# Patient Record
Sex: MALE | Race: BLACK OR AFRICAN AMERICAN | Employment: PART TIME | URBAN - METROPOLITAN AREA
[De-identification: names, ages, dates, MRNs, and addresses within clinical notes are randomized per-mention and may not be internally consistent; named-entity substitution may affect disease eponyms.]

---

## 2017-04-28 ENCOUNTER — APPOINTMENT (OUTPATIENT)
Dept: GENERAL RADIOLOGY | Age: 47
End: 2017-04-28
Attending: EMERGENCY MEDICINE
Payer: MEDICAID

## 2017-04-28 ENCOUNTER — HOSPITAL ENCOUNTER (EMERGENCY)
Age: 47
Discharge: HOME | End: 2017-04-28
Attending: EMERGENCY MEDICINE
Payer: MEDICAID

## 2017-04-28 VITALS
DIASTOLIC BLOOD PRESSURE: 60 MMHG | OXYGEN SATURATION: 97 % | HEIGHT: 66 IN | HEART RATE: 61 BPM | TEMPERATURE: 98.5 F | WEIGHT: 198 LBS | SYSTOLIC BLOOD PRESSURE: 123 MMHG | BODY MASS INDEX: 31.82 KG/M2 | RESPIRATION RATE: 16 BRPM

## 2017-04-28 DIAGNOSIS — M54.42 ACUTE LEFT-SIDED LOW BACK PAIN WITH LEFT-SIDED SCIATICA: Primary | ICD-10-CM

## 2017-04-28 PROCEDURE — 99283 EMERGENCY DEPT VISIT LOW MDM: CPT

## 2017-04-28 PROCEDURE — 72100 X-RAY EXAM L-S SPINE 2/3 VWS: CPT

## 2017-04-28 PROCEDURE — 74011250637 HC RX REV CODE- 250/637: Performed by: EMERGENCY MEDICINE

## 2017-04-28 PROCEDURE — 96372 THER/PROPH/DIAG INJ SC/IM: CPT

## 2017-04-28 PROCEDURE — 74011250636 HC RX REV CODE- 250/636: Performed by: EMERGENCY MEDICINE

## 2017-04-28 PROCEDURE — 74011636637 HC RX REV CODE- 636/637: Performed by: EMERGENCY MEDICINE

## 2017-04-28 RX ORDER — PREDNISONE 20 MG/1
60 TABLET ORAL
Status: COMPLETED | OUTPATIENT
Start: 2017-04-28 | End: 2017-04-28

## 2017-04-28 RX ORDER — PREDNISONE 20 MG/1
60 TABLET ORAL DAILY
Qty: 12 TAB | Refills: 0 | Status: SHIPPED | OUTPATIENT
Start: 2017-04-28 | End: 2017-05-02

## 2017-04-28 RX ORDER — DIAZEPAM 5 MG/1
5 TABLET ORAL
Status: COMPLETED | OUTPATIENT
Start: 2017-04-28 | End: 2017-04-28

## 2017-04-28 RX ORDER — KETOROLAC TROMETHAMINE 30 MG/ML
60 INJECTION, SOLUTION INTRAMUSCULAR; INTRAVENOUS
Status: COMPLETED | OUTPATIENT
Start: 2017-04-28 | End: 2017-04-28

## 2017-04-28 RX ORDER — OXYCODONE AND ACETAMINOPHEN 5; 325 MG/1; MG/1
1-2 TABLET ORAL
Qty: 13 TAB | Refills: 0 | Status: SHIPPED | OUTPATIENT
Start: 2017-04-28 | End: 2017-06-04

## 2017-04-28 RX ORDER — HYDROMORPHONE HYDROCHLORIDE 1 MG/ML
1 INJECTION, SOLUTION INTRAMUSCULAR; INTRAVENOUS; SUBCUTANEOUS
Status: COMPLETED | OUTPATIENT
Start: 2017-04-28 | End: 2017-04-28

## 2017-04-28 RX ADMIN — KETOROLAC TROMETHAMINE 60 MG: 30 INJECTION, SOLUTION INTRAMUSCULAR at 12:31

## 2017-04-28 RX ADMIN — DIAZEPAM 5 MG: 5 TABLET ORAL at 12:29

## 2017-04-28 RX ADMIN — PREDNISONE 60 MG: 20 TABLET ORAL at 12:29

## 2017-04-28 RX ADMIN — HYDROMORPHONE HYDROCHLORIDE 1 MG: 1 INJECTION, SOLUTION INTRAMUSCULAR; INTRAVENOUS; SUBCUTANEOUS at 12:33

## 2017-04-28 NOTE — DISCHARGE INSTRUCTIONS
We hope that we have addressed all of your medical concerns. The examination and treatment you received in the Emergency Department were for an emergent problem and were not intended as complete care. It is important that you follow up with your healthcare provider(s) for ongoing care. If your symptoms worsen or do not improve as expected, and you are unable to reach your usual health care provider(s), you should return to the Emergency Department. Today's healthcare is undergoing tremendous change, and patient satisfaction surveys are one of the many tools to assess the quality of medical care. You may receive a survey from the [employer] regarding your experience in the Emergency Department. I hope that your experience has been completely positive, particularly the medical care that I provided. As such, please participate in the survey; anything less than excellent does not meet my expectations or intentions. [facility] and [facility] participate in nationally recognized quality of care measures. If your blood pressure is greater than 120/80, as reported below, we urge that you seek medical care to address the potential of high blood pressure, commonly known as hypertension. Hypertension can be hereditary or can be caused by certain medical conditions, pain, stress, or \"white coat syndrome. \"       Please make an appointment with your health care provider(s) for follow up of your Emergency Department visit. VITALS:   Patient Vitals for the past 8 hrs:   Temp Pulse Resp BP SpO2   04/28/17 1327 - 61 16 123/60 97 %   04/28/17 1101 98.5 °F (36.9 °C) 69 18 117/73 95 %          Thank you for allowing us to provide you with medical care today. We realize that you have many choices for your emergency care needs. Please choose us in the future for any continued health care needs. Jess Nguyen, [address] [facility] [place]: [phone]            No results found for this or any previous visit (from the past 24 hour(s)). Xr Spine Lumb 2 Or 3 V    Result Date: 4/28/2017  EXAM:  XR SPINE LUMB 2 OR 3 V INDICATION: Back pain with left leg radiculopathy. COMPARISON: None. FINDINGS: AP, lateral and spot lateral views of the lumbar spine demonstrate normal alignment. The vertebral body heights and disc spaces are well-preserved. There is no fracture, subluxation or other acute abnormality. IMPRESSION: Normal lumbar spine. Back Pain, Emergency or Urgent Symptoms: Care Instructions  Your Care Instructions  Many people have back pain at one time or another. In most cases, pain gets better with self-care that includes over-the-counter pain medicine, ice, heat, and exercises. Unless you have symptoms of a severe injury or heart attack, you may be able to give yourself a few days before you call a doctor. But some back problems are very serious. Do not ignore symptoms that need to be checked right away. Follow-up care is a key part of your treatment and safety. Be sure to make and go to all appointments, and call your doctor if you are having problems. It's also a good idea to know your test results and keep a list of the medicines you take. How can you care for yourself at home? · Sit or lie in positions that are most comfortable and that reduce your pain. Try one of these positions when you lie down:  ¨ Lie on your back with your knees bent and supported by large pillows. ¨ Lie on the floor with your legs on the seat of a sofa or chair. [place] on your side with your knees and hips bent and a pillow between your legs. ¨ Lie on your stomach if it does not make pain worse. · Do not sit up in bed, and avoid soft couches and twisted positions. Bed rest can help relieve pain at first, but it delays healing. Avoid bed rest after the first day. · Change positions every 30 minutes.  If you must sit for long periods of time, take breaks from sitting. Get up and walk around, or lie flat. · Try using a heating pad on a low or medium setting, for 15 to 20 minutes every 2 or 3 hours. Try a warm shower in place of one session with the heating pad. You can also buy single-use heat wraps that last up to 8 hours. You can also try ice or cold packs on your back for 10 to 20 minutes at a time, several times a day. (Put a thin cloth between the ice pack and your skin.) This reduces pain and makes it easier to be active and exercise. · Take pain medicines exactly as directed. ¨ If the doctor gave you a prescription medicine for pain, take it as prescribed. ¨ If you are not taking a prescription pain medicine, ask your doctor if you can take an over-the-counter medicine. When should you call for help? Call 911 anytime you think you may need emergency care. For example, call if:  · You are unable to move a leg at all. · You have back pain with severe belly pain. · You have symptoms of a heart attack. These may include:  ¨ Chest pain or pressure, or a strange feeling in the chest.  ¨ Sweating. ¨ Shortness of breath. ¨ Nausea or vomiting. ¨ Pain, pressure, or a strange feeling in the back, neck, jaw, or upper belly or in one or both shoulders or arms. ¨ Lightheadedness or sudden weakness. ¨ A fast or irregular heartbeat. After you call 911, the  may tell you to chew 1 adult-strength or 2 to 4 low-dose aspirin. Wait for an ambulance. Do not try to drive yourself. Call your doctor now or seek immediate medical care if:  · You have new or worse symptoms in your arms, legs, chest, belly, or buttocks. Symptoms may include:  ¨ Numbness or tingling. ¨ Weakness. ¨ Pain. · You lose bladder or bowel control. · You have back pain and:  ¨ You have injured your back while lifting or doing some other activity.  Call if the pain is severe, has not gone away after 1 or 2 days, and you cannot do your normal daily activities. ¨ You have had a back injury before that needed treatment. ¨ Your pain has lasted longer than 4 weeks. ¨ You have had weight loss you cannot explain. ¨ You are age 48 or older. ¨ You have cancer now or have had it before. Watch closely for changes in your health, and be sure to contact your doctor if you are not getting better as expected. Where can you learn more? Go to http://shawn-kristan.info/. Enter S380 in the search box to learn more about \"Back Pain, Emergency or Urgent Symptoms: Care Instructions. \"  Current as of: May 27, 2016  Content Version: 11.2  © 6999-0684 [employer]. Care instructions adapted under license by [employer] (which disclaims liability or warranty for this information). If you have questions about a medical condition or this instruction, always ask your healthcare professional. [address] any warranty or liability for your use of this information. Sciatica: Exercises  Your Care Instructions  Here are some examples of typical rehabilitation exercises for your condition. Start each exercise slowly. Ease off the exercise if you start to have pain. Your doctor or physical therapist will tell you when you can start these exercises and which ones will work best for you. When you are not being active, find a comfortable position for rest. Some people are comfortable on the floor or a medium-firm bed with a small pillow under their head and another under their knees. Some people prefer to lie on their side with a pillow between their knees. Don't stay in one position for too long. Take short walks (10 to 20 minutes) every 2 to 3 hours. Avoid slopes, hills, and stairs until you feel better. Walk only distances you can manage without pain, especially leg pain. How to do the exercises  Back stretches    1. Get down on your hands and knees on the floor. 2. Relax your head and allow it to droop. Round your back up toward the ceiling until you feel a nice stretch in your upper, middle, and lower back. Hold this stretch for as long as it feels comfortable, or about 15 to 30 seconds. 3. Return to the starting position with a flat back while you are on your hands and knees. 4. Let your back sway by pressing your stomach toward the floor. Lift your buttocks toward the ceiling. 5. Hold this position for 15 to 30 seconds. 6. Repeat 2 to 4 times. Follow-up care is a key part of your treatment and safety. Be sure to make and go to all appointments, and call your doctor if you are having problems. It's also a good idea to know your test results and keep a list of the medicines you take. Where can you learn more? Go to http://shawn-kristan.info/. Enter I538 in the search box to learn more about \"Sciatica: Exercises. \"  Current as of: May 23, 2016  Content Version: 11.2  © 3951-6473 [employer]. Care instructions adapted under license by [employer] (which disclaims liability or warranty for this information). If you have questions about a medical condition or this instruction, always ask your healthcare professional. [place] [address] any warranty or liability for your use of this information. Sciatica: Care Instructions  Your Care Instructions    Sciatica (say \"qpt-EW-yu-kuh\") is an irritation of one of the sciatic nerves, which come from the spinal cord in the lower back. The sciatic nerves and their branches extend down through the buttock to the foot. Sciatica can develop when an injured disc in the back presses against a spinal nerve root. Its main symptom is pain, numbness, or weakness that is often worse in the leg or foot than in the back. Sciatica often will improve and go away with time. Early treatment usually includes medicines and exercises to relieve pain. Follow-up care is a key part of your treatment and safety.  Be sure to make and go to all appointments, and call your doctor if you are having problems. It's also a good idea to know your test results and keep a list of the medicines you take. How can you care for yourself at home? · Take pain medicines exactly as directed. ¨ If the doctor gave you a prescription medicine for pain, take it as prescribed. ¨ If you are not taking a prescription pain medicine, ask your doctor if you can take an over-the-counter medicine. · Use heat or ice to relieve pain. ¨ To apply heat, put a warm water bottle, heating pad set on low, or warm cloth on your back. Do not go to sleep with a heating pad on your skin. ¨ To use ice, put ice or a cold pack on the area for 10 to 20 minutes at a time. Put a thin cloth between the ice and your skin. · Avoid sitting if possible, unless it feels better than standing. · Alternate lying down with short walks. Increase your walking distance as you are able to without making your symptoms worse. · Do not do anything that makes your symptoms worse. When should you call for help? Call 911 anytime you think you may need emergency care. For example, call if:  · You are unable to move a leg at all. Call your doctor now or seek immediate medical care if:  · You have new or worse symptoms in your legs or buttocks. Symptoms may include:  ¨ Numbness or tingling. ¨ Weakness. ¨ Pain. · You lose bladder or bowel control. Watch closely for changes in your health, and be sure to contact your doctor if:  · You are not getting better as expected. Where can you learn more? Go to http://shawn-kristan.info/. Enter [phone] in the search box to learn more about \"Sciatica: Care Instructions. \"  Current as of: May 23, 2016  Content Version: 11.2  © 2879-7586 [employer]. Care instructions adapted under license by [employer] (which disclaims liability or warranty for this information).  If you have questions about a medical condition or this instruction, always ask your healthcare professional. [place] [address] any warranty or liability for your use of this information.

## 2017-04-28 NOTE — LETTER
[facility] EMERGENCY DEPT 
[address] [facility] [address] [id number] 
[phone] Work/School Note Date: 4/28/2017 To Whom It May concern: 
 
Nena Calvert was seen and treated today in the emergency room by the following provider(s): 
Attending Provider: Iron Harris MD 
Physician Assistant: Renetta Cuevas PA-C. Nena Calvert may return to work on 5/1/17.  
 
Sincerely, 
 
 
 
 
Renetta Cuevas PA-C

## 2017-04-28 NOTE — ED NOTES
The patient was discharged home by JOHYN Livingston in stable condition. The patient is alert and oriented, in no respiratory distress. The patient's diagnosis, condition and treatment were explained. The patient expressed understanding. No prescriptions given. Work note given. A discharge plan has been developed. A  was not involved in the process. Aftercare instructions were given. Pt ambulatory out of the ED.

## 2017-04-28 NOTE — ED NOTES
Patient resting on stretcher in no distress. Patient medicated for pain as ordered. Patient tolerated PO and IM medication well. Patient verbalizes no requests or concerns at this time. Call bell in reach. Will continue to monitor.

## 2017-04-28 NOTE — ED PROVIDER NOTES
HPI Comments: 56-year-old male presents to emergency department for evaluation of lower back pain. Pain began approximately 3 days ago. The day before the pain began, patient did do some lifting. He denies injury or trauma to the back. The pain has slowly gotten worse. It is located in the left lower back radiates into the left buttock. He has no numbness or tingling of the lower extremities. He has no saddle anesthesia. He has no loss of bowel or bladder control or difficulty urinating. No urinary retention. No fever or chills. No dysuria, frequency or urgency. No nausea or vomiting. No abdominal pain. He denies any history of chronic back pain. No injection drug use. No history of abscesses. He tried ibuprofen and Tylenol with no relief. Standing upright makes the pain worse. Patient reports pain is slightly improved with lying on his right side. Pain is currently rated 10 out of 10. Pain is described as sharp and shooting. Social hx  +smoker  No alcohol    Patient is a 42 y.o. male presenting with back pain. The history is provided by the patient. Back Pain    Pertinent negatives include no chest pain, no fever, no numbness, no headaches, no abdominal pain, no dysuria and no weakness. History reviewed. No pertinent past medical history. History reviewed. No pertinent surgical history. History reviewed. No pertinent family history. Social History     Social History    Marital status:      Spouse name: N/A    Number of children: N/A    Years of education: N/A     Occupational History    Not on file. Social History Main Topics    Smoking status: Current Every Day Smoker    Smokeless tobacco: Not on file    Alcohol use No    Drug use: Not on file    Sexual activity: Not on file     Other Topics Concern    Not on file     Social History Narrative         ALLERGIES: Review of patient's allergies indicates no known allergies.     Review of Systems   Constitutional: Negative for chills and fever. Respiratory: Negative for chest tightness and shortness of breath. Cardiovascular: Negative for chest pain. Gastrointestinal: Negative for abdominal pain, nausea and vomiting. Genitourinary: Negative for decreased urine volume, difficulty urinating, dysuria, flank pain, frequency and urgency. Musculoskeletal: Positive for back pain. Negative for arthralgias, myalgias, neck pain and neck stiffness. Skin: Negative for rash and wound. Neurological: Negative for weakness, numbness and headaches. All other systems reviewed and are negative. Vitals:    04/28/17 1101   BP: 117/73   Pulse: 69   Resp: 18   Temp: 98.5 °F (36.9 °C)   SpO2: 95%   Weight: 89.8 kg (198 lb)   Height: 5' 6\" (1.676 m)            Physical Exam   Constitutional: He is oriented to person, place, and time. He appears well-developed and well-nourished. No distress. HENT:   Head: Normocephalic and atraumatic. Eyes: Conjunctivae and EOM are normal. Pupils are equal, round, and reactive to light. Neck: Normal range of motion. Neck supple. No midline tenderness to palpation of cspine. Cardiovascular: Normal rate and regular rhythm. Pulmonary/Chest: Effort normal and breath sounds normal. No respiratory distress. He has no wheezes. He exhibits no tenderness. Abdominal: Soft. Bowel sounds are normal. He exhibits no distension and no mass. There is no tenderness. There is no rebound and no guarding. No aortic bruits,  No femoral bruits. 2+ femoral pulses     Musculoskeletal: Normal range of motion. He exhibits no edema or tenderness. No TLS spine pain with palpation. Left lumbar tender to palpation at SI joint. No stepoffs, no deformity  No redness, rashes, warmth, or cellulitis. 5/5 flexion/extension of hips bilaterally  5/5 great toes strength bilaterally  5/5 dorsiflexion/plantarflexion bilaterally  Straight leg raise negative. No saddle anesthesia present.      Neurological: He is oriented to person, place, and time. He has normal reflexes. No cranial nerve deficit. He exhibits normal muscle tone. Coordination normal.   Skin: Skin is warm and dry. No rash noted. No erythema. Psychiatric: He has a normal mood and affect. His behavior is normal. Judgment and thought content normal.   Nursing note and vitals reviewed. MDM  Number of Diagnoses or Management Options  Acute left-sided low back pain with left-sided sciatica:   Diagnosis management comments: 19-year-old male presenting for lower back pain. He is neurovascularly intact. He is afebrile. He is nontoxic-appearing. Plan: X-ray of the pain medication    1:55 PM  The patient is in overall good health. The patient denies a history of IV drug abuse, skin infections, or chronic back problems. Pt is ambulatory full weight bearing. Pt is eating and drinking coffee in ER. The patient has low back pain without signs of spinal cord compression, cauda equina syndrome, infection, abscess, aneurysm, or other medically serious etiology. The patient is neurologically intact. Given the low risk of these diagnoses further testing and evaluation for these possibilities does not appear to be indicated at this time. The patient has been instructed to return if the symptoms worsen or change in any way. Standard narcotic and sedating medication warnings given  Patient's results have been reviewed with them. Patient and/or family have verbally conveyed their understanding and agreement of the patient's signs, symptoms, diagnosis, treatment and prognosis and additionally agree to follow up as recommended or return to the Emergency Room should their condition change prior to follow-up. Discharge instructions have also been provided to the patient with some educational information regarding their diagnosis as well a list of reasons why they would want to return to the ER prior to their follow-up appointment should their condition change.             ED Course Procedures         Pt case including HPI, PE, and all available lab and radiology results has been discussed with attending physician. Opportunity to evaluate patient has been provided to ER attending. Discharge and prescription plan has been agreed upon.

## 2017-04-28 NOTE — ED NOTES
New orders received. Patient updated regarding plan of care; verbalizes understanding and agreement to current care plan. Call bell in reach. Family at bedside.

## 2017-06-04 ENCOUNTER — HOSPITAL ENCOUNTER (EMERGENCY)
Age: 47
Discharge: HOME | End: 2017-06-04
Attending: STUDENT IN AN ORGANIZED HEALTH CARE EDUCATION/TRAINING PROGRAM
Payer: MEDICAID

## 2017-06-04 ENCOUNTER — APPOINTMENT (OUTPATIENT)
Dept: GENERAL RADIOLOGY | Age: 47
End: 2017-06-04
Attending: NURSE PRACTITIONER
Payer: MEDICAID

## 2017-06-04 VITALS
DIASTOLIC BLOOD PRESSURE: 76 MMHG | HEART RATE: 57 BPM | SYSTOLIC BLOOD PRESSURE: 119 MMHG | RESPIRATION RATE: 17 BRPM | TEMPERATURE: 98.6 F | HEIGHT: 67 IN | BODY MASS INDEX: 28.25 KG/M2 | OXYGEN SATURATION: 96 % | WEIGHT: 180 LBS

## 2017-06-04 DIAGNOSIS — R07.9 CHEST PAIN, UNSPECIFIED TYPE: Primary | ICD-10-CM

## 2017-06-04 LAB
ALBUMIN SERPL BCP-MCNC: 3.2 G/DL (ref 3.5–5)
ALBUMIN/GLOB SERPL: 1 {RATIO} (ref 1.1–2.2)
ALP SERPL-CCNC: 85 U/L (ref 45–117)
ALT SERPL-CCNC: 23 U/L (ref 12–78)
ANION GAP BLD CALC-SCNC: 10 MMOL/L (ref 5–15)
AST SERPL W P-5'-P-CCNC: 17 U/L (ref 15–37)
BASOPHILS # BLD AUTO: 0.1 K/UL (ref 0–0.1)
BASOPHILS # BLD: 0 % (ref 0–1)
BILIRUB SERPL-MCNC: 0.6 MG/DL (ref 0.2–1)
BUN SERPL-MCNC: 14 MG/DL (ref 6–20)
BUN/CREAT SERPL: 16 (ref 12–20)
CALCIUM SERPL-MCNC: 8.6 MG/DL (ref 8.5–10.1)
CHLORIDE SERPL-SCNC: 108 MMOL/L (ref 97–108)
CO2 SERPL-SCNC: 22 MMOL/L (ref 21–32)
CREAT SERPL-MCNC: 0.87 MG/DL (ref 0.7–1.3)
D DIMER PPP FEU-MCNC: 0.23 MG/L FEU (ref 0–0.65)
EOSINOPHIL # BLD: 0.2 K/UL (ref 0–0.4)
EOSINOPHIL NFR BLD: 2 % (ref 0–7)
ERYTHROCYTE [DISTWIDTH] IN BLOOD BY AUTOMATED COUNT: 12.8 % (ref 11.5–14.5)
GLOBULIN SER CALC-MCNC: 3.3 G/DL (ref 2–4)
GLUCOSE SERPL-MCNC: 86 MG/DL (ref 65–100)
HCT VFR BLD AUTO: 39.5 % (ref 36.6–50.3)
HGB BLD-MCNC: 13.9 G/DL (ref 12.1–17)
LYMPHOCYTES # BLD AUTO: 24 % (ref 12–49)
LYMPHOCYTES # BLD: 2.6 K/UL (ref 0.8–3.5)
MCH RBC QN AUTO: 33.2 PG (ref 26–34)
MCHC RBC AUTO-ENTMCNC: 35.2 G/DL (ref 30–36.5)
MCV RBC AUTO: 94.3 FL (ref 80–99)
MONOCYTES # BLD: 0.5 K/UL (ref 0–1)
MONOCYTES NFR BLD AUTO: 5 % (ref 5–13)
NEUTS SEG # BLD: 7.8 K/UL (ref 1.8–8)
NEUTS SEG NFR BLD AUTO: 69 % (ref 32–75)
PLATELET # BLD AUTO: 343 K/UL (ref 150–400)
POTASSIUM SERPL-SCNC: 4 MMOL/L (ref 3.5–5.1)
PROT SERPL-MCNC: 6.5 G/DL (ref 6.4–8.2)
RBC # BLD AUTO: 4.19 M/UL (ref 4.1–5.7)
SODIUM SERPL-SCNC: 140 MMOL/L (ref 136–145)
TROPONIN I SERPL-MCNC: <0.04 NG/ML
WBC # BLD AUTO: 11.2 K/UL (ref 4.1–11.1)

## 2017-06-04 PROCEDURE — 93005 ELECTROCARDIOGRAM TRACING: CPT

## 2017-06-04 PROCEDURE — 84484 ASSAY OF TROPONIN QUANT: CPT | Performed by: NURSE PRACTITIONER

## 2017-06-04 PROCEDURE — 80053 COMPREHEN METABOLIC PANEL: CPT | Performed by: NURSE PRACTITIONER

## 2017-06-04 PROCEDURE — 71020 XR CHEST PA LAT: CPT

## 2017-06-04 PROCEDURE — 36415 COLL VENOUS BLD VENIPUNCTURE: CPT | Performed by: NURSE PRACTITIONER

## 2017-06-04 PROCEDURE — 85025 COMPLETE CBC W/AUTO DIFF WBC: CPT | Performed by: NURSE PRACTITIONER

## 2017-06-04 PROCEDURE — 85379 FIBRIN DEGRADATION QUANT: CPT | Performed by: NURSE PRACTITIONER

## 2017-06-04 PROCEDURE — 99285 EMERGENCY DEPT VISIT HI MDM: CPT

## 2017-06-04 RX ORDER — IBUPROFEN 800 MG/1
800 TABLET ORAL
Qty: 15 TAB | Refills: 0 | Status: SHIPPED | OUTPATIENT
Start: 2017-06-04

## 2017-06-04 NOTE — DISCHARGE INSTRUCTIONS
Chest Pain: Care Instructions  Your Care Instructions  There are many things that can cause chest pain. Some are not serious and will get better on their own in a few days. But some kinds of chest pain need more testing and treatment. Your doctor may have recommended a follow-up visit in the next 8 to 12 hours. If you are not getting better, you may need more tests or treatment. Even though your doctor has released you, you still need to watch for any problems. The doctor carefully checked you, but sometimes problems can develop later. If you have new symptoms or if your symptoms do not get better, get medical care right away. If you have worse or different chest pain or pressure that lasts more than 5 minutes or you passed out (lost consciousness), call 911 or seek other emergency help right away. A medical visit is only one step in your treatment. Even if you feel better, you still need to do what your doctor recommends, such as going to all suggested follow-up appointments and taking medicines exactly as directed. This will help you recover and help prevent future problems. How can you care for yourself at home? · Rest until you feel better. · Take your medicine exactly as prescribed. Call your doctor if you think you are having a problem with your medicine. · Do not drive after taking a prescription pain medicine. When should you call for help? Call 911 if:  · You passed out (lost consciousness). · You have severe difficulty breathing. · You have symptoms of a heart attack. These may include:  ¨ Chest pain or pressure, or a strange feeling in your chest.  ¨ Sweating. ¨ Shortness of breath. ¨ Nausea or vomiting. ¨ Pain, pressure, or a strange feeling in your back, neck, jaw, or upper belly or in one or both shoulders or arms. ¨ Lightheadedness or sudden weakness. ¨ A fast or irregular heartbeat.   After you call 911, the  may tell you to chew 1 adult-strength or 2 to 4 low-dose aspirin. Wait for an ambulance. Do not try to drive yourself. Call your doctor today if:  · You have any trouble breathing. · Your chest pain gets worse. · You are dizzy or lightheaded, or you feel like you may faint. · You are not getting better as expected. · You are having new or different chest pain. Where can you learn more? Go to http://shawn-kristan.info/. Enter A120 in the search box to learn more about \"Chest Pain: Care Instructions. \"  Current as of: May 27, 2016  Content Version: 11.2  © 9879-3831 [employer]. Care instructions adapted under license by [employer] (which disclaims liability or warranty for this information). If you have questions about a medical condition or this instruction, always ask your healthcare professional. [address] any warranty or liability for your use of this information. Musculoskeletal Chest Pain: Care Instructions  Your Care Instructions  Chest pain is not always a sign that something is wrong with your heart or that you have another serious problem. The doctor thinks your chest pain is caused by strained muscles or ligaments, inflamed chest cartilage, or another problem in your chest, rather than by your heart. You may need more tests to find the cause of your chest pain. Follow-up care is a key part of your treatment and safety. Be sure to make and go to all appointments, and call your doctor if you are having problems. Its also a good idea to know your test results and keep a list of the medicines you take. How can you care for yourself at home? · Take pain medicines exactly as directed. ¨ If the doctor gave you a prescription medicine for pain, take it as prescribed. ¨ If you are not taking a prescription pain medicine, ask your doctor if you can take an over-the-counter medicine. · Rest and protect the sore area.   · Stop, change, or take a break from any activity that may be causing your pain or soreness. · Put ice or a cold pack on the sore area for 10 to 20 minutes at a time. Try to do this every 1 to 2 hours for the next 3 days (when you are awake) or until the swelling goes down. Put a thin cloth between the ice and your skin. · After 2 or 3 days, apply a heating pad set on low or a warm cloth to the area that hurts. Some doctors suggest that you go back and forth between hot and cold. · Do not wrap or tape your ribs for support. This may cause you to take smaller breaths, which could increase your risk of lung problems. · Mentholated creams such as Bengay or Icy Hot may soothe sore muscles. Follow the instructions on the package. · Follow your doctor's instructions for exercising. · Gentle stretching and massage may help you get better faster. Stretch slowly to the point just before pain begins, and hold the stretch for at least 15 to 30 seconds. Do this 3 or 4 times a day. Stretch just after you have applied heat. · As your pain gets better, slowly return to your normal activities. Any increased pain may be a sign that you need to rest a while longer. When should you call for help? Call 911 anytime you think you may need emergency care. For example, call if:  · You have chest pain or pressure. This may occur with:  ¨ Sweating. ¨ Shortness of breath. ¨ Nausea or vomiting. ¨ Pain that spreads from the chest to the neck, jaw, or one or both shoulders or arms. ¨ Dizziness or lightheadedness. ¨ A fast or uneven pulse. After calling 911, chew 1 adult-strength aspirin. Wait for an ambulance. Do not try to drive yourself. · You have sudden chest pain and shortness of breath, or you cough up blood. Call your doctor now or seek immediate medical care if:  · You have any trouble breathing. · Your chest pain gets worse.   · Your chest pain occurs consistently with exercise and is relieved by rest.  Watch closely for changes in your health, and be sure to contact your doctor if:  · Your chest pain does not get better after 1 week. Where can you learn more? Go to http://shawn-kristan.info/. Enter V293 in the search box to learn more about \"Musculoskeletal Chest Pain: Care Instructions. \"  Current as of: May 27, 2016  Content Version: 11.2  © 0631-9243 [employer]. Care instructions adapted under license by [employer] (which disclaims liability or warranty for this information). If you have questions about a medical condition or this instruction, always ask your healthcare professional. [place] [address] any warranty or liability for your use of this information. We hope that we have addressed all of your medical concerns. The examination and treatment you received in the Emergency Department were for an emergent problem and were not intended as complete care. It is important that you follow up with your healthcare provider(s) for ongoing care. If your symptoms worsen or do not improve as expected, and you are unable to reach your usual health care provider(s), you should return to the Emergency Department. Today's healthcare is undergoing tremendous change, and patient satisfaction surveys are one of the many tools to assess the quality of medical care. You may receive a survey from the [employer] organization regarding your experience in the Emergency Department. I hope that your experience has been completely positive, particularly the medical care that I provided. As such, please participate in the survey; anything less than excellent does not meet my expectations or intentions. [facility] and [address] [place] [address] participate in nationally recognized quality of care measures. If your blood pressure is greater than 120/80, as reported below, we urge that you seek medical care to address the potential of high blood pressure, commonly known as hypertension.    Hypertension can be hereditary or can be caused by certain medical conditions, pain, stress, or \"white coat syndrome. \"       Please make an appointment with your health care provider(s) for follow up of your Emergency Department visit. VITALS:   Patient Vitals for the past 8 hrs:   Temp Pulse Resp BP SpO2   06/04/17 1745 - (!) 56 17 106/67 96 %   06/04/17 1730 - (!) 49 12 125/72 97 %   06/04/17 1715 - (!) 51 17 121/69 95 %   06/04/17 1700 - (!) 55 15 114/66 95 %   06/04/17 1645 - (!) 46 16 111/65 96 %   06/04/17 1515 - (!) 56 18 152/85 95 %   06/04/17 1500 - 65 12 (!) 158/135 95 %   06/04/17 1445 98.6 °F (37 °C) 65 13 153/87 95 %          Thank you for allowing us to provide you with medical care today. We realize that you have many choices for your emergency care needs. Please choose us in the future for any continued health care needs. Joaquin Palmer, [address].   Office: [phone]            Recent Results (from the past 24 hour(s))   CBC WITH AUTOMATED DIFF    Collection Time: 06/04/17  2:46 PM   Result Value Ref Range    WBC 11.2 (H) 4.1 - 11.1 K/uL    RBC 4.19 4. 10 - 5.70 M/uL    HGB 13.9 12.1 - 17.0 g/dL    HCT 39.5 36.6 - 50.3 %    MCV 94.3 80.0 - 99.0 FL    MCH 33.2 26.0 - 34.0 PG    MCHC 35.2 30.0 - 36.5 g/dL    RDW 12.8 11.5 - 14.5 %    PLATELET 025 304 - 973 K/uL    NEUTROPHILS 69 32 - 75 %    LYMPHOCYTES 24 12 - 49 %    MONOCYTES 5 5 - 13 %    EOSINOPHILS 2 0 - 7 %    BASOPHILS 0 0 - 1 %    ABS. NEUTROPHILS 7.8 1.8 - 8.0 K/UL    ABS. LYMPHOCYTES 2.6 0.8 - 3.5 K/UL    ABS. MONOCYTES 0.5 0.0 - 1.0 K/UL    ABS. EOSINOPHILS 0.2 0.0 - 0.4 K/UL    ABS.  BASOPHILS 0.1 0.0 - 0.1 K/UL   METABOLIC PANEL, COMPREHENSIVE    Collection Time: 06/04/17  2:46 PM   Result Value Ref Range    Sodium 140 136 - 145 mmol/L    Potassium 4.0 3.5 - 5.1 mmol/L    Chloride 108 97 - 108 mmol/L    CO2 22 21 - 32 mmol/L    Anion gap 10 5 - 15 mmol/L    Glucose 86 65 - 100 mg/dL BUN 14 6 - 20 MG/DL    Creatinine 0.87 0.70 - 1.30 MG/DL    BUN/Creatinine ratio 16 12 - 20      GFR est AA >60 >60 ml/min/1.73m2    GFR est non-AA >60 >60 ml/min/1.73m2    Calcium 8.6 8.5 - 10.1 MG/DL    Bilirubin, total 0.6 0.2 - 1.0 MG/DL    ALT (SGPT) 23 12 - 78 U/L    AST (SGOT) 17 15 - 37 U/L    Alk. phosphatase 85 45 - 117 U/L    Protein, total 6.5 6.4 - 8.2 g/dL    Albumin 3.2 (L) 3.5 - 5.0 g/dL    Globulin 3.3 2.0 - 4.0 g/dL    A-G Ratio 1.0 (L) 1.1 - 2.2     TROPONIN I    Collection Time: 06/04/17  2:46 PM   Result Value Ref Range    Troponin-I, Qt. <0.04 <0.05 ng/mL   EKG, 12 LEAD, INITIAL    Collection Time: 06/04/17  2:46 PM   Result Value Ref Range    Ventricular Rate 47 BPM    Atrial Rate 47 BPM    P-R Interval 116 ms    QRS Duration 104 ms    Q-T Interval 394 ms    QTC Calculation (Bezet) 348 ms    Calculated P Axis 17 degrees    Calculated R Axis 24 degrees    Calculated T Axis 23 degrees    Diagnosis Sinus bradycardia  No previous ECGs available      D DIMER    Collection Time: 06/04/17  2:47 PM   Result Value Ref Range    D-dimer 0.23 0.00 - 0.65 mg/L FEU       Xr Chest Pa Lat    Result Date: 6/4/2017  Indication: Chest pain for 2 months. Shortness of breath. Exam: PA and lateral views of the chest. There is no prior study for direct comparison. Findings: Cardiomediastinal silhouette is within normal limits. Lungs are clear bilaterally. Pleural spaces are normal. Osseous structures are intact. IMPRESSION: No acute cardiopulmonary disease.

## 2017-06-04 NOTE — ED PROVIDER NOTES
HPI Comments: 42 y.o. male with no significant past medical history who presents from work via EMS with chief complaint of chest pain. Patient complains of \"sharp, stinging\" chest pain that has been intermittent for 2 months. Patient states in the morning he feels as if it is hard for him to breathe due to a heavy pressure on his chest but states he is never short of breath or nauseated. Patient states the pain does not radiate anywhere and is unchanged with activity level. Patient states his episode began this morning at 0930 while he was at work and has had the pain multiple times today. Patient states he presents because his supervisor told him to come in. Patient states he's been under a lot of stress within the past year. Patient denies vomiting. There are no other acute medical concerns at this time. Social hx: current smoker, no alcohol  PCP: None    Note written by Rufina Valerio, as dictated by Vania Escobedo NP 3:09 PM     The history is provided by the patient. No  was used. History reviewed. No pertinent past medical history. Past Surgical History:   Procedure Laterality Date    HX HERNIA REPAIR           History reviewed. No pertinent family history. Social History     Social History    Marital status:      Spouse name: N/A    Number of children: N/A    Years of education: N/A     Occupational History    Not on file. Social History Main Topics    Smoking status: Current Every Day Smoker     Packs/day: 1.00     Years: 10.00    Smokeless tobacco: Not on file    Alcohol use No    Drug use: No    Sexual activity: Not on file     Other Topics Concern    Not on file     Social History Narrative         ALLERGIES: Review of patient's allergies indicates no known allergies. Review of Systems   Constitutional: Negative for appetite change, chills, fatigue and fever. HENT: Negative for congestion, rhinorrhea and sore throat.     Respiratory: Negative for cough and shortness of breath. Cardiovascular: Positive for chest pain. Negative for leg swelling. Gastrointestinal: Negative for abdominal pain, constipation, nausea and vomiting. Genitourinary: Negative for difficulty urinating and dysuria. Musculoskeletal: Negative for back pain and neck pain. Skin: Negative for rash and wound. Neurological: Negative for headaches. All other systems reviewed and are negative. Vitals:    06/04/17 1445   BP: 153/87   Pulse: 65   Resp: 13   Temp: 98.6 °F (37 °C)   SpO2: 95%   Weight: 81.6 kg (180 lb)   Height: 5' 7\" (1.702 m)            Physical Exam   Constitutional: He is oriented to person, place, and time. He appears well-developed and well-nourished. No distress. HENT:   Head: Normocephalic and atraumatic. Right Ear: External ear normal.   Left Ear: External ear normal.   Nose: Nose normal.   Mouth/Throat: Oropharynx is clear and moist.   Eyes: Conjunctivae and EOM are normal. Pupils are equal, round, and reactive to light. Right eye exhibits no discharge. Left eye exhibits no discharge. Neck: Normal range of motion. Neck supple. Cardiovascular: Normal rate, regular rhythm, normal heart sounds and intact distal pulses. No murmur heard. Pulmonary/Chest: Effort normal and breath sounds normal.   Abdominal: Soft. Bowel sounds are normal. He exhibits no distension. There is no tenderness. There is no rebound and no guarding. Musculoskeletal: Normal range of motion. He exhibits no edema or tenderness. Neurological: He is alert and oriented to person, place, and time. No cranial nerve deficit. Coordination normal.   Skin: Skin is warm and dry. No rash noted. Psychiatric: He has a normal mood and affect. His behavior is normal. Judgment and thought content normal.   Nursing note and vitals reviewed.   Note written by [facility], Scribe, as dictated by Hyacinth Perdomo NP 3:10 PM      MDM  Number of Diagnoses or Management Options  Chest pain, unspecified type:   Diagnosis management comments: 41 yo M with 2 mo hx of chest pain that he describes as sharp/stinging/burning lasting 1-2 minutes several times a day. Occurs with or without activity. + heavy lifting at work. States in am he feels it is hard to breathe due to chest pressure. Denies nausea, SOB, radiation of pain, diaphoresis. + increase in stress level. Labs, EKG, CXR ordered. ED EKG interpretation:  Rhythm: sinus bradycardia; and regular . Rate (approx.): 47; Axis: normal; P wave: normal; QRS interval: normal ; ST/T wave: normal; in  Lead: II SB; Other findings: normal. This EKG was interpreted by Dewayne Matthews NP,ED Provider. Results  Labs:  CBC:11.4, H&H 13.9 & 39.5   CMP:, K+4.0, BUN 14, Creatinine 0.87  D dimer:0.23  Troponin:<0.04     XR CHEST PA LAT (Accession [id number]) (Order [id number])      Allergies       No Known Allergies     Result Information     Status Provider Status       Final result (Exam End: 6/4/2017  3:26 PM) Open      Study Result     Indication: Chest pain for 2 months. Shortness of breath.     Exam: PA and lateral views of the chest.     There is no prior study for direct comparison.     Findings: Cardiomediastinal silhouette is within normal limits. Lungs are clear  bilaterally. Pleural spaces are normal. Osseous structures are intact.     IMPRESSION  IMPRESSION: No acute cardiopulmonary disease.     Results reviewed. Discussed hx, presentation and findings with Dr. Apple Gonzalez who agrees with plan of care and plan for discharge with FU with PCP (provider names included with discharge instructions), cardiology, return to the ED for worsening sx. Pt is chest free at the time of discharge. Denies other associated sx. Pt and wife agreeable to plan of care and discharge plan. Discussed worsening sx with pt and wife prior to discharge.             Amount and/or Complexity of Data Reviewed  Clinical lab tests: ordered and reviewed  Tests in the radiology section of CPT®: ordered and reviewed  Discuss the patient with other providers: yes (Dr. Chanelle Foreman)    Patient Progress  Patient progress: stable    ED Course       Procedures

## 2017-06-04 NOTE — LETTER
[address] 
[address] [facility] [address] [id number] 
[phone] Work/School Note Date: 6/4/2017 To Whom It May concern: 
 
Tyrone Turner was seen and treated today in the emergency room by the following provider(s): 
Attending Provider: Jazmin Mckenzie MD 
Nurse Practitioner: Dewayne Matthews NP. Tyrone Turner may return to work on 06/06/2017. Sincerely, Dewayne Matthews NP

## 2017-06-04 NOTE — ED TRIAGE NOTES
Triage: Pt arrives via EMS from work with c/o intermittent stabbing/heavy chest pain x 2 months. Feels 'heaviness and hard to breath' and then goes away. No known medical history. Dominick nausea, dizziness. Smoker x 12 years. 324mg ASA given in EMS.

## 2017-06-05 LAB
ATRIAL RATE: 47 BPM
CALCULATED P AXIS, ECG09: 17 DEGREES
CALCULATED R AXIS, ECG10: 24 DEGREES
CALCULATED T AXIS, ECG11: 23 DEGREES
DIAGNOSIS, 93000: NORMAL
P-R INTERVAL, ECG05: 116 MS
Q-T INTERVAL, ECG07: 394 MS
QRS DURATION, ECG06: 104 MS
QTC CALCULATION (BEZET), ECG08: 348 MS
VENTRICULAR RATE, ECG03: 47 BPM

## 2017-06-15 ENCOUNTER — OFFICE VISIT (OUTPATIENT)
Dept: CARDIOLOGY CLINIC | Age: 47
End: 2017-06-15

## 2017-06-15 VITALS
HEART RATE: 51 BPM | DIASTOLIC BLOOD PRESSURE: 76 MMHG | WEIGHT: 187 LBS | OXYGEN SATURATION: 98 % | SYSTOLIC BLOOD PRESSURE: 110 MMHG | HEIGHT: 67 IN | RESPIRATION RATE: 16 BRPM | BODY MASS INDEX: 29.35 KG/M2

## 2017-06-15 DIAGNOSIS — F17.200 SMOKER: ICD-10-CM

## 2017-06-15 DIAGNOSIS — R07.9 CHEST PAIN, UNSPECIFIED TYPE: ICD-10-CM

## 2017-06-15 DIAGNOSIS — Z71.6 ENCOUNTER FOR COUNSELING FOR TOBACCO USE DISORDER: ICD-10-CM

## 2017-06-15 DIAGNOSIS — I10 BENIGN ESSENTIAL HTN: ICD-10-CM

## 2017-06-15 DIAGNOSIS — I20.0 UNSTABLE ANGINA (HCC): Primary | ICD-10-CM

## 2017-06-15 DIAGNOSIS — Z82.49 FAMILY HISTORY OF EARLY CAD: ICD-10-CM

## 2017-06-15 NOTE — PROGRESS NOTES
[place]: Rudy Dalton  [phone]    HPI: Shruthi Encarnacion, a 42 y.o. M referred by Dr. Viviane Calderon for evaluation of chest pain, shortness of breath. He reports the the chest pain first started about 1 year ago, but has been occurring more frequently the last couple months. The pain usually occurs in the morning, lasts a couple seconds to minutes. The pain is described a \"heavy feeling & a slight sharp pain\". When this happens its difficult for him to breath. Pain subsides without intervention. It occasionally occurs sometimes during the day with activity. The particular episodes of chest pain that brought him to the ED occurred while he was mopping the floor and this time the chest pain persisted after several minutes. No reported KENNEDY, lightheadedness, dizziness, KENNEDY or LE edema. He mentions he has been very stressed out, his wife recently broke her leg. ECG today shows sinus bradycardia with non-specific T wave abnormalities, he is compensated on exam.  Family hx: He has 3 maternal uncles they all have bypass surgery. Father  cause of death unknown. Mother healthy. Social Hx: smoke 1 ppd for 24 years. Socially drinks. . 3 children. Occupation . He walks often. PSH: hernia repair as a teenager. ECG in ED showed sinus bradycardia with RBBB. Assessment/Plan:  Unstable angina. Chest pain with typical and atypical features, risk factors. Will proceed with a trleadmil stress echo. RBBB. Normal variant. Smoker. Counselled on cessation. He  has no past medical history on file. All other systems negative except as above. PE  Vitals:    06/15/17 1256   BP: 110/76   Pulse: (!) 51   Resp: 16   SpO2: 98%   Weight: 187 lb (84.8 kg)   Height: 5' 7\" (1.702 m)    Body mass index is 29.29 kg/(m^2).    General appearance - alert, well appearing, and in no distress  Mental status - affect appropriate to mood  Eyes - sclera anicteric, moist mucous membranes  Neck - supple, no JVD  Chest - clear to auscultation, no wheezes, rales or rhonchi  Heart - slow rate, regular rhythm, normal S1, S2, no murmurs, rubs, clicks or gallops  Abdomen - soft, nontender, nondistended  Neurological -no focal deficit  Extremities - peripheral pulses normal, no pedal edema      Recent Labs:  No results found for: CHOL, CHOLX, CHLST, CHOLV, 410963, HDL, LDL, LDLC, DLDLP, TGLX, TRIGL, TRIGP, CHHD, CHHDX  Lab Results   Component Value Date/Time    Creatinine 0.87 06/04/2017 02:46 PM     Lab Results   Component Value Date/Time    BUN 14 06/04/2017 02:46 PM     Lab Results   Component Value Date/Time    Potassium 4.0 06/04/2017 02:46 PM     No results found for: HBA1C, HGBE8, BYH7XMHI, SVY8YPFT  Lab Results   Component Value Date/Time    HGB 13.9 06/04/2017 02:46 PM     Lab Results   Component Value Date/Time    PLATELET 434 33/00/5466 02:46 PM       Reviewed:  History reviewed. No pertinent past medical history. History   Smoking Status    Current Every Day Smoker    Packs/day: 1.00    Years: 10.00   Smokeless Tobacco    Not on file     History   Alcohol Use No     No Known Allergies    Current Outpatient Prescriptions   Medication Sig    ibuprofen (MOTRIN) 800 mg tablet Take 1 Tab by mouth every eight (8) hours as needed for Pain. No current facility-administered medications for this visit.         MD [facility] [place]  [address], [address] [facility] [address]  [facility], [address] [place] [address]

## 2017-06-30 ENCOUNTER — CLINICAL SUPPORT (OUTPATIENT)
Dept: CARDIOLOGY CLINIC | Age: 47
End: 2017-06-30

## 2017-06-30 DIAGNOSIS — F17.200 SMOKER: ICD-10-CM

## 2017-06-30 DIAGNOSIS — R07.9 CHEST PAIN, UNSPECIFIED TYPE: ICD-10-CM

## 2017-06-30 DIAGNOSIS — I20.0 UNSTABLE ANGINA (HCC): ICD-10-CM

## 2017-07-10 ENCOUNTER — TELEPHONE (OUTPATIENT)
Dept: CARDIOLOGY CLINIC | Age: 47
End: 2017-07-10

## 2017-07-10 NOTE — TELEPHONE ENCOUNTER
Please let pt know stress test was normal. thx  ----- Message -----     From: [place], Card Result In     Sent: 7/3/2017  12:52 PM       To: Madeleine Cook MD          Returned patient's call, 2 pt identifiers used  Above normal stress echo results given to patient's spouse, Jenny Vazquez.

## 2019-03-25 ENCOUNTER — TELEPHONE (OUTPATIENT)
Dept: INTERNAL MEDICINE CLINIC | Age: 49
End: 2019-03-25

## 2019-03-25 NOTE — TELEPHONE ENCOUNTER
----- Message from Dorcas Hill sent at 3/25/2019  3:22 PM EDT -----  Regarding: Dr. Allen Camara [facility] and registered as a new pt with this office and has an appointment on 6/10 at 8:15am. His spouse, Alan Bennett is requesting an earlier new patient appointment if one becomes available. Best contact number is [phone] or [phone].         Message copied/pasted from [facility]

## 2021-02-23 ENCOUNTER — TELEPHONE (OUTPATIENT)
Dept: INTERNAL MEDICINE CLINIC | Age: 51
End: 2021-02-23

## 2021-02-23 NOTE — TELEPHONE ENCOUNTER
----- Message from Tomas Gonzalez sent at 2/23/2021  9:39 AM EST -----  Regarding: Dr. Bearden Maid: [phone]  General Message/Vendor Calls    Caller's first and last name: Jimmye Essex - Wife      Reason for call: NP Appt request      Callback required yes/no and why: Yes/scheduling      Best contact number(s): [phone]      Details to clarify the request: N/A      Message from [facility]

## 2021-04-01 ENCOUNTER — OFFICE VISIT (OUTPATIENT)
Dept: INTERNAL MEDICINE CLINIC | Age: 51
End: 2021-04-01
Payer: COMMERCIAL

## 2021-04-01 VITALS
OXYGEN SATURATION: 95 % | SYSTOLIC BLOOD PRESSURE: 133 MMHG | BODY MASS INDEX: 33.9 KG/M2 | HEIGHT: 67 IN | DIASTOLIC BLOOD PRESSURE: 87 MMHG | RESPIRATION RATE: 16 BRPM | TEMPERATURE: 98.7 F | WEIGHT: 216 LBS | HEART RATE: 71 BPM

## 2021-04-01 DIAGNOSIS — I10 BENIGN ESSENTIAL HTN: ICD-10-CM

## 2021-04-01 DIAGNOSIS — R06.2 WHEEZING: ICD-10-CM

## 2021-04-01 DIAGNOSIS — R07.9 CHEST PAIN, UNSPECIFIED TYPE: ICD-10-CM

## 2021-04-01 DIAGNOSIS — F41.9 ANXIETY: ICD-10-CM

## 2021-04-01 DIAGNOSIS — M54.31 RIGHT SIDED SCIATICA: Primary | ICD-10-CM

## 2021-04-01 PROCEDURE — 99204 OFFICE O/P NEW MOD 45 MIN: CPT | Performed by: INTERNAL MEDICINE

## 2021-04-01 RX ORDER — ALBUTEROL SULFATE 90 UG/1
1 AEROSOL, METERED RESPIRATORY (INHALATION)
Qty: 1 INHALER | Refills: 5 | Status: SHIPPED | OUTPATIENT
Start: 2021-04-01

## 2021-04-01 NOTE — PROGRESS NOTES
SUBJECTIVE:   Mr. Belkis Milligan is a 52 y.o. male who is here for follow up of routine medical issues. Chief Complaint   Patient presents with   Georgianna Olszewski [facility]     new pt establishing care with Dr Amol Beach     pt has wheezing at night       He has back pain, low back, \"right cheek\" and sometimes down the right leg. Pain was present for a year. His wife is concerned that he has asthma. He wheezes at times. He sees a psychiatrist, Dr. Vilchis Grandchild. \"I've been going through a lot of stress. \" Stressors: Wife's health, sons' \"problems\", COVID. Not on medicines. His psychiatrist ordered labs recently: Cholesterol was high, Vit D deficient. At this time, he is otherwise doing well and has brought no other complaints to my attention today. For a list of the medical issues addressed today, see the assessment and plan below. PMH:   Past Medical History:   Diagnosis Date    Anxiety     Chest pain     Stress test normal    Hypertension        Past Surgical History:   Procedure Laterality Date    HX HERNIA REPAIR         All: He has No Known Allergies. Current Outpatient Medications   Medication Sig    ibuprofen (MOTRIN) 800 mg tablet Take 1 Tab by mouth every eight (8) hours as needed for Pain. No current facility-administered medications for this visit. FH: His family history is not on file. His father  age mid 61s. SH: He is . Currently he is unemployed; odd jobs. He reports that he has quit smoking. He has a 10.00 pack-year smoking history. He has never used smokeless tobacco. He reports that he does not drink alcohol or use drugs. ROS: See above; Complete ROS otherwise negative.      OBJECTIVE:   Vitals:   Visit Vitals  /87 (BP 1 Location: Left upper arm, BP Patient Position: Sitting, BP Cuff Size: Adult long)   Pulse 71   Temp 98.7 °F (37.1 °C) (Temporal)   Resp 16   Ht 5' 7\" (1.702 m)   Wt 216 lb (98 kg)   SpO2 95%   BMI 33.83 kg/m²      Gen: Pleasant 52 y.o.  male in NAD. HEENT: PERRLA. EOMI. OP moist and pink. Neck: Supple. No LAD. HEART: RRR, No M/G/R.    LUNGS: CTAB No W/R. ABDOMEN: S, NT, ND, BS+. EXTREMITIES: Warm. No C/C/E.  MUSCULOSKELETAL: Normal ROM, muscle strength 5/5 all groups. NEURO: Alert and oriented x 3. Cranial nerves grossly intact. No focal sensory or motor deficits noted. SKIN: Warm. Dry. No rashes or other lesions noted. Lab Results   Component Value Date/Time    Sodium 140 06/04/2017 02:46 PM    Potassium 4.0 06/04/2017 02:46 PM    Chloride 108 06/04/2017 02:46 PM    CO2 22 06/04/2017 02:46 PM    Anion gap 10 06/04/2017 02:46 PM    Glucose 86 06/04/2017 02:46 PM    BUN 14 06/04/2017 02:46 PM    Creatinine 0.87 06/04/2017 02:46 PM    BUN/Creatinine ratio 16 06/04/2017 02:46 PM    GFR est AA >60 06/04/2017 02:46 PM    GFR est non-AA >60 06/04/2017 02:46 PM    Calcium 8.6 06/04/2017 02:46 PM    Bilirubin, total 0.6 06/04/2017 02:46 PM    ALT (SGPT) 23 06/04/2017 02:46 PM    Alk. phosphatase 85 06/04/2017 02:46 PM    Protein, total 6.5 06/04/2017 02:46 PM    Albumin 3.2 (L) 06/04/2017 02:46 PM    Globulin 3.3 06/04/2017 02:46 PM    A-G Ratio 1.0 (L) 06/04/2017 02:46 PM       Lab Results   Component Value Date/Time    WBC 11.2 (H) 06/04/2017 02:46 PM    HGB 13.9 06/04/2017 02:46 PM    HCT 39.5 06/04/2017 02:46 PM    PLATELET 783 00/06/9854 02:46 PM    MCV 94.3 06/04/2017 02:46 PM         ASSESSMENT/ PLAN: Diagnoses and all orders for this visit:    1. Right sided sciatica  -     REFERRAL TO ORTHOPEDICS    2. Benign essential HTN    3. Chest pain, unspecified type    4. Wheezing  -     albuterol (PROVENTIL HFA, VENTOLIN HFA, PROAIR HFA) 90 mcg/actuation inhaler; Take 1 Puff by inhalation every four (4) hours as needed for Wheezing.  -     XR CHEST PA LAT; Future  -     PULMONARY FUNCTION TEST; Future    5. Anxiety         Follow-up and Dispositions    · Return in about 6 months (around 10/1/2021) for HTN.            I have reviewed the patient's medications and risks/side effects/benefits were discussed. Diagnosis(-es) explained to patient and questions answered. Literature provided where appropriate.

## 2021-04-09 NOTE — TELEPHONE ENCOUNTER
Patients wife Deidra Urbano called regarding Echo results, please call her at [phone].  Thank you Rhofade Counseling: Rhofade is a topical medication which can decrease superficial blood flow where applied. Side effects are uncommon and include stinging, redness and allergic reactions.

## 2021-08-27 ENCOUNTER — HOSPITAL ENCOUNTER (EMERGENCY)
Age: 51
Discharge: HOME | End: 2021-08-27
Attending: STUDENT IN AN ORGANIZED HEALTH CARE EDUCATION/TRAINING PROGRAM
Payer: COMMERCIAL

## 2021-08-27 VITALS
RESPIRATION RATE: 16 BRPM | DIASTOLIC BLOOD PRESSURE: 89 MMHG | HEART RATE: 55 BPM | SYSTOLIC BLOOD PRESSURE: 133 MMHG | BODY MASS INDEX: 35.15 KG/M2 | WEIGHT: 218.7 LBS | OXYGEN SATURATION: 97 % | TEMPERATURE: 98.3 F | HEIGHT: 66 IN

## 2021-08-27 DIAGNOSIS — S39.012A BACK STRAIN, INITIAL ENCOUNTER: Primary | ICD-10-CM

## 2021-08-27 PROCEDURE — 74011250636 HC RX REV CODE- 250/636: Performed by: STUDENT IN AN ORGANIZED HEALTH CARE EDUCATION/TRAINING PROGRAM

## 2021-08-27 PROCEDURE — 99283 EMERGENCY DEPT VISIT LOW MDM: CPT

## 2021-08-27 PROCEDURE — 96372 THER/PROPH/DIAG INJ SC/IM: CPT

## 2021-08-27 RX ORDER — METHOCARBAMOL 750 MG/1
750 TABLET, FILM COATED ORAL 4 TIMES DAILY
COMMUNITY

## 2021-08-27 RX ORDER — KETOROLAC TROMETHAMINE 10 MG/1
10 TABLET, FILM COATED ORAL
Qty: 12 TABLET | Refills: 0 | Status: SHIPPED | OUTPATIENT
Start: 2021-08-27 | End: 2021-08-30

## 2021-08-27 RX ORDER — CYCLOBENZAPRINE HCL 10 MG
10 TABLET ORAL
Qty: 15 TABLET | Refills: 0 | Status: SHIPPED | OUTPATIENT
Start: 2021-08-27 | End: 2021-09-01

## 2021-08-27 RX ORDER — KETOROLAC TROMETHAMINE 30 MG/ML
15 INJECTION, SOLUTION INTRAMUSCULAR; INTRAVENOUS
Status: COMPLETED | OUTPATIENT
Start: 2021-08-27 | End: 2021-08-27

## 2021-08-27 RX ADMIN — KETOROLAC TROMETHAMINE 15 MG: 30 INJECTION, SOLUTION INTRAMUSCULAR at 13:40

## 2021-08-27 NOTE — ED TRIAGE NOTES
Pt presents to ED with c/o left upper back pain since yesterday. Pain is exacerbated by movement. Pt denies any hx of trauma.

## 2021-08-28 NOTE — ED PROVIDER NOTES
Patient is a 70-year-old male present emergency department with left-sided back pain. Patient says that he was twisting and moving heavy objects when he had sudden onset left-sided back pain. Patient denies any trauma or falls. He also denies any upper or lower extremity weakness numbness or tingling. Patient denies any chest pain or shortness of breath. Past Medical History:   Diagnosis Date    Anxiety     Chest pain     Stress test normal    Hypertension        Past Surgical History:   Procedure Laterality Date    HX HERNIA REPAIR           History reviewed. No pertinent family history. Social History     Socioeconomic History    Marital status:      Spouse name: Not on file    Number of children: Not on file    Years of education: Not on file    Highest education level: Not on file   Occupational History    Not on file   Tobacco Use    Smoking status: Former Smoker     Packs/day: 1.00     Years: 10.00     Pack years: 10.00    Smokeless tobacco: Never Used   Vaping Use    Vaping Use: Never used   Substance and Sexual Activity    Alcohol use: No    Drug use: No    Sexual activity: Not on file   Other Topics Concern    Not on file   Social History Narrative    Not on file     Social Determinants of Health     Financial Resource Strain:     Difficulty of Paying Living Expenses:    Food Insecurity:     Worried About Running Out of Food in the Last Year:     [address] in the Last Year:    Transportation Needs:     Lack of Transportation (Medical):      Lack of Transportation (Non-Medical):    Physical Activity:     Days of Exercise per Week:     Minutes of Exercise per Session:    Stress:     Feeling of Stress :    Social Connections:     Frequency of Communication with Friends and Family:     Frequency of Social Gatherings with Friends and Family:     Attends Anabaptism Services:     Active Member of Clubs or Organizations:     Attends Club or Organization Meetings:     Marital Status:    Intimate Partner Violence:     Fear of Current or Ex-Partner:     Emotionally Abused:     Physically Abused:     Sexually Abused: ALLERGIES: Patient has no known allergies. Review of Systems   Musculoskeletal: Positive for back pain. All other systems reviewed and are negative. Vitals:    08/27/21 1244 08/27/21 1350   BP: (!) 151/83 133/89   Pulse: (!) 56 (!) 55   Resp: 16 16   Temp: 98.3 °F (36.8 °C)    SpO2: 97% 97%   Weight: 99.2 kg (218 lb 11.1 oz)    Height: 5' 6\" (1.676 m)             Physical Exam  Vitals and nursing note reviewed. Constitutional:       Appearance: Normal appearance. HENT:      Head: Normocephalic and atraumatic. Eyes:      Extraocular Movements: Extraocular movements intact. Pupils: Pupils are equal, round, and reactive to light. Cardiovascular:      Rate and Rhythm: Normal rate and regular rhythm. Pulmonary:      Effort: Pulmonary effort is normal.      Breath sounds: Normal breath sounds. Musculoskeletal:      Thoracic back: Spasms and tenderness present. No bony tenderness. Decreased range of motion. Back:       Comments: Left paraspinal tenderness   Skin:     General: Skin is warm. Neurological:      General: No focal deficit present. Mental Status: He is alert and oriented to person, place, and time. Psychiatric:         Mood and Affect: Mood normal.         Behavior: Behavior normal.          MDM  Number of Diagnoses or Management Options  Back strain, initial encounter  Diagnosis management comments: A/P: Thoracic back strain. 16-year-old male with left-sided paraspinal tenderness no midline bony tenderness range of motion restricted secondary to pain. Will treat with NSAIDs, muscle relaxers patient to be discharged.          Procedures

## 2022-03-18 PROBLEM — Z71.6 ENCOUNTER FOR COUNSELING FOR TOBACCO USE DISORDER: Status: ACTIVE | Noted: 2017-06-15

## 2022-03-19 PROBLEM — Z82.49 FAMILY HISTORY OF EARLY CAD: Status: ACTIVE | Noted: 2017-06-15

## 2022-03-19 PROBLEM — I10 BENIGN ESSENTIAL HTN: Status: ACTIVE | Noted: 2017-06-15

## 2022-03-19 PROBLEM — R07.9 CHEST PAIN: Status: ACTIVE | Noted: 2017-06-15

## 2022-03-20 PROBLEM — F17.200 SMOKER: Status: ACTIVE | Noted: 2017-06-15

## 2022-03-20 PROBLEM — I20.0 UNSTABLE ANGINA (HCC): Status: ACTIVE | Noted: 2017-06-15

## 2022-04-06 ENCOUNTER — TELEPHONE (OUTPATIENT)
Dept: INTERNAL MEDICINE CLINIC | Age: 52
End: 2022-04-06

## 2022-04-06 NOTE — TELEPHONE ENCOUNTER
----- Message from Zane Garza sent at 4/6/2022 11:42 AM EDT -----  Subject: Message to Provider    QUESTIONS  Information for Provider? Patient wants to know if Miralax, prune juice,   etc. is good because he has not had a bowel movement since Saturday. What   does the doctor recommend for diet because he has been using clear broth? Please call wife Nikolas Dobson at the number below. Thanks.  ---------------------------------------------------------------------------  --------------  Gianni FRANKLIN  What is the best way for the office to contact you? OK to leave message on   voicemail  Preferred Call Back Phone Number? [phone]  ---------------------------------------------------------------------------  --------------  SCRIPT ANSWERS  Relationship to Patient? Other  Representative Name? Nikolas Dobson - wife  Is the Representative on the appropriate HIPAA document in Epic?  Yes

## 2022-04-06 NOTE — TELEPHONE ENCOUNTER
Spoke with patients wife Claude Bond. Two pt identifiers confirmed. Claude Bond advised that patient can try the Miralax or prune juice. Advised to have patient avoid caffiene, increase fiber intake and fluid intake. Advised if no relief by tomorrow to give the office a call back. Claude Bond verbalized understanding of information discussed w/ no further questions at this time.    Rafael Izaguirre LPN

## 2022-04-19 ENCOUNTER — OFFICE VISIT (OUTPATIENT)
Dept: INTERNAL MEDICINE CLINIC | Age: 52
End: 2022-04-19
Payer: COMMERCIAL

## 2022-04-19 VITALS
SYSTOLIC BLOOD PRESSURE: 118 MMHG | BODY MASS INDEX: 33.25 KG/M2 | OXYGEN SATURATION: 99 % | DIASTOLIC BLOOD PRESSURE: 74 MMHG | HEART RATE: 74 BPM | RESPIRATION RATE: 22 BRPM | TEMPERATURE: 97.9 F | WEIGHT: 206 LBS

## 2022-04-19 DIAGNOSIS — K52.9 COLITIS: Primary | ICD-10-CM

## 2022-04-19 PROCEDURE — 99213 OFFICE O/P EST LOW 20 MIN: CPT | Performed by: INTERNAL MEDICINE

## 2022-04-19 RX ORDER — PREDNISONE 20 MG/1
40 TABLET ORAL DAILY
Qty: 10 TABLET | Refills: 1 | Status: SHIPPED | OUTPATIENT
Start: 2022-04-19 | End: 2022-04-24

## 2022-04-19 RX ORDER — PREDNISONE 50 MG/1
50 TABLET ORAL
COMMUNITY
Start: 2022-04-05 | End: 2022-04-19 | Stop reason: SDUPTHER

## 2022-04-19 RX ORDER — HYDROCODONE BITARTRATE AND ACETAMINOPHEN 5; 325 MG/1; MG/1
1 TABLET ORAL
COMMUNITY
Start: 2022-04-05

## 2022-04-19 RX ORDER — ONDANSETRON 4 MG/1
4 TABLET, ORALLY DISINTEGRATING ORAL
COMMUNITY
Start: 2022-04-05

## 2022-04-19 NOTE — PROGRESS NOTES
Identified pt with two pt identifiers(name and ). Chief Complaint   Patient presents with    ED Follow-up     Suspected Chron's Disease [facility]     Hypertension     Wife reports high blood pressure       Vitals:    22 1003   BP: 118/74   Pulse: 74   Resp: 22   Temp: 97.9 °F (36.6 °C)   SpO2: 99%   Weight: 206 lb (93.4 kg)   PainSc:   0 - No pain      Health Maintenance Due   Topic    Hepatitis C Screening     COVID-19 Vaccine (1)    DTaP/Tdap/Td series (1 - Tdap)    Lipid Screen     Colorectal Cancer Screening Combo        Depression Screening:  :     3 most recent PHQ Screens 2022   Little interest or pleasure in doing things Not at all   Feeling down, depressed, irritable, or hopeless Several days   Total Score PHQ 2 1        Fall Risk Assessment:  :     No flowsheet data found. Abuse Screening:  :     No flowsheet data found. Coordination of Care Questionnaire:  :     1. Have you been to the ER, urgent care clinic since your last visit? Hospitalized since your last visit? Yes [facility] 2 weeks ago     2. Have you seen or consulted any other health care providers outside of the [address] [place] [address] since your last visit? Include any pap smears or colon screening.  No

## 2022-04-19 NOTE — PROGRESS NOTES
PROGRESS NOTE  Name: Khadar Fritz   : 1970       ASSESSMENT/ PLAN:     Diagnoses and all orders for this visit:    Colitis  -     REFERRAL TO GASTROENTEROLOGY    Other orders  -     predniSONE (DELTASONE) 20 mg tablet; Take 40 mg by mouth daily for 5 days. , Normal, Disp-10 Tablet, R-1      Follow-up and Dispositions  ·   Return in 6 months (on 10/19/2022) for HTN. I have reviewed the patient's medications and risks/side effects/benefits were discussed. Diagnosis(-es) explained to patient and questions answered. Literature provided where appropriate. SUBJECTIVE  Mr. Khadar Fritz presents today acutely for     Chief Complaint   Patient presents with   [facility] ED Follow-up     Suspected Chron's Disease [facility]     Hypertension     Wife reports high blood pressure        He was seen in ER at [facility], for \"major stomach pains\"--\"He was moaning and groaning like a whale. \"  He had vomiting. +Night sweats. CT scan was done. He was told he might have Crohn's colitis. He was given Rx for prednisone, zofran, and percocet. Symptoms have subsided. Apparently he has had recurrences of this. OBJECTIVE  Visit Vitals  /74 (BP 1 Location: Right arm, BP Patient Position: Sitting, BP Cuff Size: Adult)   Pulse 74   Temp 97.9 °F (36.6 °C)   Resp 22   Wt 206 lb (93.4 kg)   SpO2 99%   BMI 33.25 kg/m²     Gen: Pleasant 54 y.o.  male in NAD. HEENT: PERRLA. EOMI. OP moist and pink. Neck: Supple. No LAD. HEART: RRR, No M/G/R.   LUNGS: CTAB No W/R. ABDOMEN: S, NT, ND, BS+. EXTREMITIES: Warm. No C/C/E. MUSCULOSKELETAL: Normal ROM, muscle strength 5/5 all groups. NEURO: Alert and oriented x 3. Cranial nerves grossly intact. No focal sensory or motor deficits noted. SKIN: Warm. Dry. No rashes or other lesions noted.

## 2023-01-01 ENCOUNTER — TELEPHONE (OUTPATIENT)
Facility: HOSPITAL | Age: 53
End: 2023-01-01

## 2023-01-01 RX ORDER — CYANOCOBALAMIN 1000 UG/ML
1000 INJECTION, SOLUTION INTRAMUSCULAR; SUBCUTANEOUS ONCE
OUTPATIENT
Start: 2023-05-18 | End: 2023-05-18

## 2023-01-01 RX ORDER — AMLODIPINE BESYLATE 5 MG/1
5 TABLET ORAL DAILY
Qty: 30 TABLET | Refills: 0 | COMMUNITY
Start: 2023-01-01 | End: 2023-01-01

## 2023-01-01 RX ORDER — CYANOCOBALAMIN 1000 UG/ML
1000 INJECTION, SOLUTION INTRAMUSCULAR; SUBCUTANEOUS ONCE
Status: CANCELLED
Start: 2023-05-18

## 2023-01-01 RX ORDER — NALOXONE HYDROCHLORIDE 4 MG/.1ML
SPRAY NASAL
COMMUNITY
Start: 2023-01-01

## 2023-01-01 RX ORDER — MORPHINE SULFATE 15 MG/1
15 TABLET, FILM COATED, EXTENDED RELEASE ORAL EVERY 12 HOURS
Qty: 60 TABLET | Refills: 0 | COMMUNITY
Start: 2023-01-01 | End: 2023-01-01 | Stop reason: SDUPTHER

## 2023-03-12 ENCOUNTER — HOSPITAL ENCOUNTER (EMERGENCY)
Age: 53
Discharge: HOME | End: 2023-03-12
Attending: FAMILY MEDICINE
Payer: COMMERCIAL

## 2023-03-12 VITALS
HEIGHT: 66 IN | RESPIRATION RATE: 16 BRPM | TEMPERATURE: 98.5 F | OXYGEN SATURATION: 97 % | WEIGHT: 220 LBS | BODY MASS INDEX: 35.36 KG/M2 | DIASTOLIC BLOOD PRESSURE: 82 MMHG | SYSTOLIC BLOOD PRESSURE: 149 MMHG | HEART RATE: 103 BPM

## 2023-03-12 DIAGNOSIS — G89.29 CHRONIC RIGHT HIP PAIN: Primary | ICD-10-CM

## 2023-03-12 DIAGNOSIS — M25.551 CHRONIC RIGHT HIP PAIN: Primary | ICD-10-CM

## 2023-03-12 PROCEDURE — 99284 EMERGENCY DEPT VISIT MOD MDM: CPT

## 2023-03-12 PROCEDURE — 96372 THER/PROPH/DIAG INJ SC/IM: CPT

## 2023-03-12 PROCEDURE — 74011250636 HC RX REV CODE- 250/636: Performed by: FAMILY MEDICINE

## 2023-03-12 RX ORDER — KETOROLAC TROMETHAMINE 30 MG/ML
30 INJECTION, SOLUTION INTRAMUSCULAR; INTRAVENOUS
Status: COMPLETED | OUTPATIENT
Start: 2023-03-12 | End: 2023-03-12

## 2023-03-12 RX ORDER — PREDNISONE 20 MG/1
40 TABLET ORAL DAILY
Qty: 10 TABLET | Refills: 0 | Status: SHIPPED | OUTPATIENT
Start: 2023-03-12 | End: 2023-03-17

## 2023-03-12 RX ADMIN — KETOROLAC TROMETHAMINE 30 MG: 30 INJECTION, SOLUTION INTRAMUSCULAR; INTRAVENOUS at 12:21

## 2023-03-12 NOTE — ED PROVIDER NOTES
EMERGENCY DEPARTMENT HISTORY AND PHYSICAL EXAM      Date: 3/12/2023  Patient Name: Cecilia Diallo    History of Presenting Illness         History Provided By:     HPI: Cecilia Diallo, is a very pleasant 50 y.o. male presenting to the ED with a chief complaint of right hip pain. Patient states he has a history of arthritis in the right hip. He is undergone x-rays and CT scan in the past.  States this feels exactly like a flare of his hip pain. Pain is achy and nonradiating. Worse with walking. Better with rest.  No fevers chills rigors. The patient denies any other symptoms at this time. PCP: Kemal Holt MD    No current facility-administered medications on file prior to encounter. Current Outpatient Medications on File Prior to Encounter   Medication Sig Dispense Refill    HYDROcodone-acetaminophen (NORCO) 5-325 mg per tablet 1 Tablet. ondansetron (ZOFRAN ODT) 4 mg disintegrating tablet 4 mg.      methocarbamoL (Robaxin-750) 750 mg tablet Take 750 mg by mouth four (4) times daily. albuterol (PROVENTIL HFA, VENTOLIN HFA, PROAIR HFA) 90 mcg/actuation inhaler Take 1 Puff by inhalation every four (4) hours as needed for Wheezing. (Patient not taking: Reported on 8/27/2021) 1 Inhaler 5    ibuprofen (MOTRIN) 800 mg tablet Take 1 Tab by mouth every eight (8) hours as needed for Pain.  15 Tab 0       Past History     Past Medical History:  Past Medical History:   Diagnosis Date    Anxiety     Chest pain     Stress test normal    Hypertension        Past Surgical History:  Past Surgical History:   Procedure Laterality Date    HX HERNIA REPAIR         Family History:  Family History   Problem Relation Age of Onset    Asthma Mother     Heart Attack Father     Heart Disease Maternal Uncle        Social History:  Social History     Tobacco Use    Smoking status: Every Day     Packs/day: 0.50     Types: Cigarettes    Smokeless tobacco: Never   Vaping Use    Vaping Use: Never used   Substance Use Topics    Alcohol use: No    Drug use: Yes     Types: Marijuana       Allergies:  No Known Allergies      Review of Systems     Review of Systems   Constitutional:  Negative for activity change, appetite change, chills, fatigue and fever. HENT:  Negative for congestion and sore throat. Eyes:  Negative for photophobia and visual disturbance. Respiratory:  Negative for cough, shortness of breath and wheezing. Cardiovascular:  Negative for chest pain, palpitations and leg swelling. Gastrointestinal:  Negative for abdominal pain, diarrhea, nausea and vomiting. Endocrine: Negative for cold intolerance and heat intolerance. Musculoskeletal:  Positive for arthralgias. Negative for gait problem and joint swelling. Skin:  Negative for color change and rash. Neurological:  Negative for dizziness and headaches. Physical Exam     Physical Exam  Constitutional:       General: He is not in acute distress. Appearance: Normal appearance. He is not toxic-appearing. HENT:      Head: Normocephalic and atraumatic. Right Ear: External ear normal.      Left Ear: External ear normal.      Mouth/Throat:      Mouth: Mucous membranes are moist.      Pharynx: Oropharynx is clear. Eyes:      Extraocular Movements: Extraocular movements intact. Conjunctiva/sclera: Conjunctivae normal.   Cardiovascular:      Rate and Rhythm: Normal rate and regular rhythm. Pulses: Normal pulses. Heart sounds: Normal heart sounds. Pulmonary:      Effort: Pulmonary effort is normal. No respiratory distress. Breath sounds: Normal breath sounds. No wheezing, rhonchi or rales. Abdominal:      General: There is no distension. Palpations: Abdomen is soft. Tenderness: There is no abdominal tenderness. There is no guarding or rebound. Musculoskeletal:         General: No deformity. Cervical back: Normal range of motion and neck supple. Right lower leg: No edema.       Left lower leg: No edema. Comments: Tenderness right greater trochanter. No pain with active nor passive range of motion of right hip. No leg length discrepancy. Skin:     Capillary Refill: Capillary refill takes less than 2 seconds. Findings: No erythema or rash. Neurological:      General: No focal deficit present. Mental Status: He is alert and oriented to person, place, and time. Gait: Gait normal.   Psychiatric:         Mood and Affect: Mood normal.         Behavior: Behavior normal.       Lab and Diagnostic Study Results     Labs -   No results found for this or any previous visit (from the past 12 hour(s)). Radiologic Studies -   @lastxrresult@  CT Results  (Last 48 hours)      None          CXR Results  (Last 48 hours)      None              Medical Decision Making   - I am the first provider for this patient. - I reviewed the vital signs, available nursing notes, past medical history, past surgical history, family history and social history. - Initial assessment performed. The patients presenting problems have been discussed, and they are in agreement with the care plan formulated and outlined with them. I have encouraged them to ask questions as they arise throughout their visit. Vital Signs-Reviewed the patient's vital signs. Patient Vitals for the past 12 hrs:   Temp Pulse Resp BP SpO2   03/12/23 1200 -- (!) 103 -- -- --   03/12/23 1154 98.5 °F (36.9 °C) -- 16 (!) 149/82 97 %       CONSULTS: (Who and What was discussed)  None      Chronic Conditions: Reviewed above     Social Determinants affecting Dx or Tx: None     Records Reviewed (source and summary of external notes): Nursing notes           ED Course/ Provider Notes (Medical Decision Making):     Patient presented to the emergency department with the aforementioned chief complaint. On examination the patient is nontoxic. Vitals were reviewed per above.   Discussed repeat imaging of hip however through shared decision-making, patient declines. We will give Toradol and prescription for prednisone. Information to follow-up with [facility] orth[facility]. Fidelina Rocha was given a thorough list of signs and symptoms that would warrant an immediate return to the emergency department. Otherwise Fidelina Rocha will follow up with PCP. Procedures   Medical Decision Makingedical Decision Making  Performed by: Gabriela Lovett DO  Procedures  None       Disposition   Disposition:     Home     All of the diagnostic tests were reviewed and questions answered. Diagnosis, care plan and treatment options were discussed. The patient understands the instructions and will follow up as directed. The patients results have been reviewed with them. They have been counseled regarding their diagnosis. The patient verbally convey understanding and agreement of the signs, symptoms, diagnosis, treatment and prognosis and additionally agrees to follow up as recommended with their PCP in 24 - 48 hours. They also agree with the care-plan and convey that all of their questions have been answered. I have also put together some discharge instructions for them that include: 1) educational information regarding their diagnosis, 2) how to care for their diagnosis at home, as well a 3) list of reasons why they would want to return to the ED prior to their follow-up appointment, should their condition change. DISCHARGE PLAN:    1. Current Discharge Medication List        START taking these medications    Details   predniSONE (DELTASONE) 20 mg tablet Take 2 Tablets by mouth daily for 5 days. With Breakfast  Qty: 10 Tablet, Refills: 0           CONTINUE these medications which have NOT CHANGED    Details   HYDROcodone-acetaminophen (NORCO) 5-325 mg per tablet 1 Tablet. ondansetron (ZOFRAN ODT) 4 mg disintegrating tablet 4 mg.      methocarbamoL (Robaxin-750) 750 mg tablet Take 750 mg by mouth four (4) times daily.       albuterol (PROVENTIL HFA, VENTOLIN HFA, PROAIR HFA) 90 mcg/actuation inhaler Take 1 Puff by inhalation every four (4) hours as needed for Wheezing. Qty: 1 Inhaler, Refills: 5    Associated Diagnoses: Wheezing      ibuprofen (MOTRIN) 800 mg tablet Take 1 Tab by mouth every eight (8) hours as needed for Pain. Qty: 15 Tab, Refills: 0               2.   Follow-up Information       Follow up With Specialties Details Why Contact Info    [facility]    [address] [place] [address]  [phone]              3.  Return to ED if worse       4. Current Discharge Medication List        START taking these medications    Details   predniSONE (DELTASONE) 20 mg tablet Take 2 Tablets by mouth daily for 5 days. With Breakfast  Qty: 10 Tablet, Refills: 0  Start date: 3/12/2023, End date: 3/17/2023               Diagnosis     Clinical Impression:    1. Chronic right hip pain        Attestations:    Selene Mar, DO    Please note that this dictation was completed with "[employer] the computer voice recognition software. Quite often unanticipated grammatical, syntax, homophones, and other interpretive errors are inadvertently transcribed by the computer software. Please disregard these errors. Please excuse any errors that have escaped final proofreading. Thank you.

## 2023-03-12 NOTE — Clinical Note
[address] [place] [id number]  [phone]    Work/School Note    Date: 3/12/2023    To Whom It May concern:      Verona Huretas was seen and treated today in the emergency room by the following provider(s):  Attending Provider: Yanelis Mazariegos DO. Verona Huertas is excused from work/school on 03/12/23. He is clear to return to work/school on 03/13/23.         Sincerely,          Lucien Cardoso DO

## 2023-03-12 NOTE — ED TRIAGE NOTES
Rt leg hurts from arthritis flare up. States is out of medicine for it but cannot remember what it is called. Wife states naproxen, ibuprofen, muscle relaxers don't work for him.

## 2023-03-30 ENCOUNTER — HOSPITAL ENCOUNTER (EMERGENCY)
Age: 53
Discharge: HOME | End: 2023-03-30
Attending: EMERGENCY MEDICINE | Admitting: EMERGENCY MEDICINE
Payer: COMMERCIAL

## 2023-03-30 VITALS
DIASTOLIC BLOOD PRESSURE: 87 MMHG | SYSTOLIC BLOOD PRESSURE: 144 MMHG | WEIGHT: 220 LBS | HEART RATE: 79 BPM | TEMPERATURE: 98.7 F | HEIGHT: 66 IN | RESPIRATION RATE: 18 BRPM | BODY MASS INDEX: 35.36 KG/M2 | OXYGEN SATURATION: 95 %

## 2023-03-30 DIAGNOSIS — M54.31 SCIATICA OF RIGHT SIDE: Primary | ICD-10-CM

## 2023-03-30 PROCEDURE — 74011250637 HC RX REV CODE- 250/637: Performed by: EMERGENCY MEDICINE

## 2023-03-30 PROCEDURE — 99283 EMERGENCY DEPT VISIT LOW MDM: CPT | Performed by: EMERGENCY MEDICINE

## 2023-03-30 RX ORDER — METHOCARBAMOL 500 MG/1
500 TABLET, FILM COATED ORAL
Qty: 15 TABLET | Refills: 0 | Status: SHIPPED | OUTPATIENT
Start: 2023-03-30 | End: 2023-04-04

## 2023-03-30 RX ORDER — HYDROCODONE BITARTRATE AND ACETAMINOPHEN 10; 325 MG/1; MG/1
1 TABLET ORAL
Qty: 10 TABLET | Refills: 0 | Status: SHIPPED | OUTPATIENT
Start: 2023-03-30 | End: 2023-04-02

## 2023-03-30 RX ORDER — HYDROCODONE BITARTRATE AND ACETAMINOPHEN 5; 325 MG/1; MG/1
2 TABLET ORAL ONCE
Status: COMPLETED | OUTPATIENT
Start: 2023-03-30 | End: 2023-03-30

## 2023-03-30 RX ORDER — METHOCARBAMOL 500 MG/1
750 TABLET, FILM COATED ORAL
Status: COMPLETED | OUTPATIENT
Start: 2023-03-30 | End: 2023-03-30

## 2023-03-30 RX ADMIN — METHOCARBAMOL 750 MG: 500 TABLET ORAL at 19:24

## 2023-03-30 RX ADMIN — HYDROCODONE BITARTRATE AND ACETAMINOPHEN 2 TABLET: 5; 325 TABLET ORAL at 19:24

## 2023-03-30 NOTE — ED PROVIDER NOTES
[facility] EMERGENCY DEPARTMENT  EMERGENCY DEPARTMENT HISTORY AND PHYSICAL EXAM      Date: 3/30/2023  Patient Name: Dai Fulton  MRN: [id number]  [place]: 1970  Date of evaluation: 3/30/2023  Provider: Pedro Hameed MD   Note Started: 7:19 PM 3/30/23    HISTORY OF PRESENT ILLNESS     Chief Complaint   Patient presents with    Hip Pain    Back Pain       History Provided By: Patient    HPI: Dai Fulton is a 50 y.o. male here with low back pain radiating down his right leg. Patient was seen here 2 weeks ago for the same. At that time he was prescribed steroids and Given a dose of Toradol in the ER. He states his pain improved but has gotten worse. He states he has been trying to get in for imaging but it seems like this is likely imaging for a lung nodule that was diagnosed at an urgent care. No recent falls. No numbness, weakness, incontinenece    PAST MEDICAL HISTORY   Past Medical History:  Past Medical History:   Diagnosis Date    Anxiety     Chest pain     Stress test normal    Hypertension        Past Surgical History:  Past Surgical History:   Procedure Laterality Date    HX HERNIA REPAIR         Family History:  Family History   Problem Relation Age of Onset    Asthma Mother     Heart Attack Father     Heart Disease Maternal Uncle        Social History:  Social History     Tobacco Use    Smoking status: Every Day     Packs/day: 0.50     Types: Cigarettes    Smokeless tobacco: Never   Vaping Use    Vaping Use: Never used   Substance Use Topics    Alcohol use: No    Drug use: Yes     Types: Marijuana       Allergies:  No Known Allergies    PCP: Deyvi Li MD    Current Meds:   Previous Medications    ALBUTEROL (PROVENTIL HFA, VENTOLIN HFA, PROAIR HFA) 90 MCG/ACTUATION INHALER    Take 1 Puff by inhalation every four (4) hours as needed for Wheezing. HYDROCODONE-ACETAMINOPHEN (NORCO) 5-325 MG PER TABLET    1 Tablet.     IBUPROFEN (MOTRIN) 800 MG TABLET    Take 1 Tab by mouth every eight (8) hours as needed for Pain. METHOCARBAMOL (ROBAXIN-750) 750 MG TABLET    Take 750 mg by mouth four (4) times daily. ONDANSETRON (ZOFRAN ODT) 4 MG DISINTEGRATING TABLET    4 mg. PHYSICAL EXAM     ED Triage Vitals [03/30/23 1831]   ED Encounter Vitals Group      BP (!) 143/83      Pulse (Heart Rate) (!) 108      Resp Rate 20      Temp 98.7 °F (37.1 °C)      Temp src       O2 Sat (%) 97 %      Weight 220 lb      Height 5' 6\"      Physical Exam  Vitals and nursing note reviewed. Constitutional:       General: He is not in acute distress. Appearance: Normal appearance. HENT:      Head: Normocephalic and atraumatic. Mouth/Throat:      Mouth: Mucous membranes are moist.   Eyes:      Extraocular Movements: Extraocular movements intact. Conjunctiva/sclera: Conjunctivae normal.   Cardiovascular:      Rate and Rhythm: Normal rate and regular rhythm. Pulmonary:      Effort: Pulmonary effort is normal. No respiratory distress. Breath sounds: Normal breath sounds. Abdominal:      Palpations: Abdomen is soft. Musculoskeletal:         General: Normal range of motion. Cervical back: Normal range of motion. Comments: No midline c/t/l spine tenderness  Pain to right lower back and buttock   Skin:     General: Skin is warm and dry. Neurological:      General: No focal deficit present. Mental Status: He is alert and oriented to person, place, and time. Mental status is at baseline. SCREENINGS              LAB, EKG AND DIAGNOSTIC RESULTS   Labs:  No results found for this or any previous visit (from the past 12 hour(s)). EKG: Initial EKG interpreted by me.  Not Applicable    Radiologic Studies:  Non-plain film images such as CT, Ultrasound and MRI are read by the radiologist. Plain radiographic images are visualized and preliminarily interpreted by the ED Physician with the following findings: Not Applicable    Interpretation per the Radiologist below, if available at the time of this note:  No results found. ED COURSE and DIFFERENTIAL DIAGNOSIS/MDM   CC/HPI Summary, DDx, ED Course, and Reassessment:   Patient is a 50 y.o. male presenting for back pain. Vitals reveal no significant abnormalities and physical exam reveals no significant abnormalities. Based on the history, physical exam, risk factors, and vitals signs, differential diagnose includes muscle strain, disk disease, arthritis, sprain, muscle spasm. I doubt cauda equina syndrome due to no abnormal neuro findings and no red flag symptoms or risk factors. Unlikely UTI, pyelonephritis , ureterolithiasis due to no urinary symptoms. Will provider symptomatic treatment in the ED. Imaging studies are not indicated given the absence of any red flags on history taking or physical examination. Will give symptomatic treatment and reassess. Records Reviewed (source and summary of external notes): Prior medical records and Nursing notes    Vitals:    Vitals:    03/30/23 1831   BP: (!) 143/83   Pulse: (!) 108   Resp: 20   Temp: 98.7 °F (37.1 °C)   SpO2: 97%   Weight: 99.8 kg (220 lb)   Height: 5' 6\" (1.676 m)        ED COURSE          Patient was given the following medications:  Medications   HYDROcodone-acetaminophen (NORCO) 5-325 mg per tablet 2 Tablet (has no administration in time range)   methocarbamoL (ROBAXIN) tablet 750 mg (has no administration in time range)       CONSULTS: (Who and What was discussed)  None     Social Determinants affecting Dx or Tx: None    Smoking Cessation: Not Applicable    FINAL IMPRESSION     1. Sciatica of right side          DISPOSITION/PLAN   Discharged    Discharge Note: The patient is stable for discharge home. The signs, symptoms, diagnosis, and discharge instructions have been discussed, understanding conveyed, and agreed upon. The patient is to follow up as recommended or return to ER should their symptoms worsen.       PATIENT REFERRED TO:  Follow-up Information Follow up With Specialties Details Why Mj Ignacio MD Internal Medicine Physician   [address]  [place]  [phone]                DISCHARGE MEDICATIONS:  Current Discharge Medication List        START taking these medications    Details   HYDROcodone-acetaminophen (Norco)  mg tablet Take 1 Tablet by mouth every six (6) hours as needed for Pain for up to 3 days. Max Daily Amount: 4 Tablets. Qty: 10 Tablet, Refills: 0  Start date: 3/30/2023, End date: 4/2/2023    Associated Diagnoses: Sciatica of right side      !! methocarbamoL (ROBAXIN) 500 mg tablet Take 1 Tablet by mouth four (4) times daily as needed for Muscle Spasm(s) for up to 5 days. Qty: 15 Tablet, Refills: 0  Start date: 3/30/2023, End date: 4/4/2023       !! - Potential duplicate medications found. Please discuss with provider. CONTINUE these medications which have NOT CHANGED    Details   HYDROcodone-acetaminophen (NORCO) 5-325 mg per tablet 1 Tablet. !! methocarbamoL (Robaxin-750) 750 mg tablet Take 750 mg by mouth four (4) times daily. !! - Potential duplicate medications found. Please discuss with provider. DISCONTINUED MEDICATIONS:  Current Discharge Medication List          I am the Primary Clinician of Record: Danita Pompa MD (electronically signed)    (Please note that parts of this dictation were completed with voice recognition software. Quite often unanticipated grammatical, syntax, homophones, and other interpretive errors are inadvertently transcribed by the computer software. Please disregards these errors.  Please excuse any errors that have escaped final proofreading.)

## 2023-03-30 NOTE — ED TRIAGE NOTES
PT. TO ED WITH C/O RIGHT LOWER BACK AND HIP PAIN. PT. WAS SEEN HERE 3/12/23 FOR THE SAME SYMPTOMS. PT. STATES UNABLE TO FOLLOW UP WITH DOCTOR BECAUSE OF INSURANCE. RX GIVEN FOR STEROIDS.

## 2023-04-11 ENCOUNTER — APPOINTMENT (OUTPATIENT)
Dept: CT IMAGING | Age: 53
DRG: 180 | End: 2023-04-11
Attending: INTERNAL MEDICINE
Payer: COMMERCIAL

## 2023-04-11 ENCOUNTER — HOSPITAL ENCOUNTER (INPATIENT)
Age: 53
LOS: 3 days | Discharge: HOME | DRG: 180 | End: 2023-04-15
Attending: EMERGENCY MEDICINE | Admitting: INTERNAL MEDICINE
Payer: COMMERCIAL

## 2023-04-11 ENCOUNTER — APPOINTMENT (OUTPATIENT)
Dept: CT IMAGING | Age: 53
DRG: 180 | End: 2023-04-11
Attending: NURSE PRACTITIONER
Payer: COMMERCIAL

## 2023-04-11 DIAGNOSIS — R16.0 LIVER MASSES: ICD-10-CM

## 2023-04-11 DIAGNOSIS — D63.8 ANEMIA OF CHRONIC DISEASE: ICD-10-CM

## 2023-04-11 DIAGNOSIS — R91.8 MASS OF UPPER LOBE OF LEFT LUNG: ICD-10-CM

## 2023-04-11 DIAGNOSIS — Z79.891 LONG TERM (CURRENT) USE OF OPIATE ANALGESIC: ICD-10-CM

## 2023-04-11 DIAGNOSIS — Z51.5 PALLIATIVE CARE ENCOUNTER: ICD-10-CM

## 2023-04-11 DIAGNOSIS — M89.9 LYTIC BONE LESIONS ON XRAY: ICD-10-CM

## 2023-04-11 DIAGNOSIS — F43.21 GRIEF: ICD-10-CM

## 2023-04-11 DIAGNOSIS — M54.59 INTRACTABLE LOW BACK PAIN: ICD-10-CM

## 2023-04-11 DIAGNOSIS — R93.7 ABNORMAL CT SCAN, LUMBAR SPINE: Primary | ICD-10-CM

## 2023-04-11 DIAGNOSIS — R53.81 DEBILITY: ICD-10-CM

## 2023-04-11 LAB
ALBUMIN SERPL-MCNC: 2.8 G/DL (ref 3.5–5)
ALBUMIN/GLOB SERPL: 0.7 (ref 1.1–2.2)
ALP SERPL-CCNC: 171 U/L (ref 45–117)
ALT SERPL-CCNC: 19 U/L (ref 12–78)
ANION GAP SERPL CALC-SCNC: 3 MMOL/L (ref 5–15)
AST SERPL-CCNC: 34 U/L (ref 15–37)
BASOPHILS # BLD: 0.1 K/UL (ref 0–0.1)
BASOPHILS NFR BLD: 1 % (ref 0–1)
BILIRUB SERPL-MCNC: 0.6 MG/DL (ref 0.2–1)
BUN SERPL-MCNC: 20 MG/DL (ref 6–20)
BUN/CREAT SERPL: 23 (ref 12–20)
CALCIUM SERPL-MCNC: 8.7 MG/DL (ref 8.5–10.1)
CHLORIDE SERPL-SCNC: 108 MMOL/L (ref 97–108)
CO2 SERPL-SCNC: 30 MMOL/L (ref 21–32)
CREAT SERPL-MCNC: 0.87 MG/DL (ref 0.7–1.3)
DIFFERENTIAL METHOD BLD: ABNORMAL
EOSINOPHIL # BLD: 0.2 K/UL (ref 0–0.4)
EOSINOPHIL NFR BLD: 2 % (ref 0–7)
ERYTHROCYTE [DISTWIDTH] IN BLOOD BY AUTOMATED COUNT: 13.7 % (ref 11.5–14.5)
GLOBULIN SER CALC-MCNC: 3.8 G/DL (ref 2–4)
GLUCOSE SERPL-MCNC: 151 MG/DL (ref 65–100)
HCT VFR BLD AUTO: 31.9 % (ref 36.6–50.3)
HGB BLD-MCNC: 10.6 G/DL (ref 12.1–17)
IMM GRANULOCYTES # BLD AUTO: 0.1 K/UL (ref 0–0.04)
IMM GRANULOCYTES NFR BLD AUTO: 1 % (ref 0–0.5)
LYMPHOCYTES # BLD: 2.4 K/UL (ref 0.8–3.5)
LYMPHOCYTES NFR BLD: 15 % (ref 12–49)
MCH RBC QN AUTO: 31 PG (ref 26–34)
MCHC RBC AUTO-ENTMCNC: 33.2 G/DL (ref 30–36.5)
MCV RBC AUTO: 93.3 FL (ref 80–99)
MONOCYTES # BLD: 0.8 K/UL (ref 0–1)
MONOCYTES NFR BLD: 5 % (ref 5–13)
NEUTS SEG # BLD: 12.6 K/UL (ref 1.8–8)
NEUTS SEG NFR BLD: 76 % (ref 32–75)
NRBC # BLD: 0 K/UL (ref 0–0.01)
NRBC BLD-RTO: 0 PER 100 WBC
PLATELET # BLD AUTO: 483 K/UL (ref 150–400)
PMV BLD AUTO: 9.1 FL (ref 8.9–12.9)
POTASSIUM SERPL-SCNC: 3.2 MMOL/L (ref 3.5–5.1)
PROT SERPL-MCNC: 6.6 G/DL (ref 6.4–8.2)
RBC # BLD AUTO: 3.42 M/UL (ref 4.1–5.7)
SODIUM SERPL-SCNC: 141 MMOL/L (ref 136–145)
WBC # BLD AUTO: 16.2 K/UL (ref 4.1–11.1)

## 2023-04-11 PROCEDURE — 96374 THER/PROPH/DIAG INJ IV PUSH: CPT

## 2023-04-11 PROCEDURE — 36415 COLL VENOUS BLD VENIPUNCTURE: CPT

## 2023-04-11 PROCEDURE — 85025 COMPLETE CBC W/AUTO DIFF WBC: CPT

## 2023-04-11 PROCEDURE — 71250 CT THORAX DX C-: CPT

## 2023-04-11 PROCEDURE — 74011250637 HC RX REV CODE- 250/637: Performed by: NURSE PRACTITIONER

## 2023-04-11 PROCEDURE — 72131 CT LUMBAR SPINE W/O DYE: CPT

## 2023-04-11 PROCEDURE — 96372 THER/PROPH/DIAG INJ SC/IM: CPT

## 2023-04-11 PROCEDURE — 74011250636 HC RX REV CODE- 250/636: Performed by: NURSE PRACTITIONER

## 2023-04-11 PROCEDURE — 74011000250 HC RX REV CODE- 250: Performed by: NURSE PRACTITIONER

## 2023-04-11 PROCEDURE — 80053 COMPREHEN METABOLIC PANEL: CPT

## 2023-04-11 PROCEDURE — 99285 EMERGENCY DEPT VISIT HI MDM: CPT

## 2023-04-11 RX ORDER — HYDROMORPHONE HYDROCHLORIDE 1 MG/ML
1 INJECTION, SOLUTION INTRAMUSCULAR; INTRAVENOUS; SUBCUTANEOUS ONCE
Status: COMPLETED | OUTPATIENT
Start: 2023-04-11 | End: 2023-04-11

## 2023-04-11 RX ORDER — CYCLOBENZAPRINE HCL 10 MG
10 TABLET ORAL
Status: COMPLETED | OUTPATIENT
Start: 2023-04-11 | End: 2023-04-11

## 2023-04-11 RX ORDER — KETOROLAC TROMETHAMINE 30 MG/ML
15 INJECTION, SOLUTION INTRAMUSCULAR; INTRAVENOUS
Status: COMPLETED | OUTPATIENT
Start: 2023-04-11 | End: 2023-04-11

## 2023-04-11 RX ORDER — LIDOCAINE 4 G/100G
3 PATCH TOPICAL
Status: COMPLETED | OUTPATIENT
Start: 2023-04-11 | End: 2023-04-12

## 2023-04-11 RX ADMIN — CYCLOBENZAPRINE 10 MG: 10 TABLET, FILM COATED ORAL at 20:31

## 2023-04-11 RX ADMIN — HYDROMORPHONE HYDROCHLORIDE 1 MG: 1 INJECTION, SOLUTION INTRAMUSCULAR; INTRAVENOUS; SUBCUTANEOUS at 22:54

## 2023-04-11 RX ADMIN — KETOROLAC TROMETHAMINE 15 MG: 30 INJECTION, SOLUTION INTRAMUSCULAR at 20:32

## 2023-04-12 ENCOUNTER — APPOINTMENT (OUTPATIENT)
Dept: MRI IMAGING | Age: 53
DRG: 180 | End: 2023-04-12
Attending: INTERNAL MEDICINE
Payer: COMMERCIAL

## 2023-04-12 ENCOUNTER — APPOINTMENT (OUTPATIENT)
Dept: GENERAL RADIOLOGY | Age: 53
DRG: 180 | End: 2023-04-12
Attending: STUDENT IN AN ORGANIZED HEALTH CARE EDUCATION/TRAINING PROGRAM
Payer: COMMERCIAL

## 2023-04-12 ENCOUNTER — APPOINTMENT (OUTPATIENT)
Dept: CT IMAGING | Age: 53
DRG: 180 | End: 2023-04-12
Attending: INTERNAL MEDICINE
Payer: COMMERCIAL

## 2023-04-12 PROBLEM — R91.8 MASS OF UPPER LOBE OF LEFT LUNG: Status: ACTIVE | Noted: 2023-04-12

## 2023-04-12 PROBLEM — R16.0 LIVER MASSES: Status: ACTIVE | Noted: 2023-01-01

## 2023-04-12 PROBLEM — D63.8 ANEMIA OF CHRONIC DISEASE: Status: ACTIVE | Noted: 2023-04-12

## 2023-04-12 PROBLEM — D63.8 ANEMIA OF CHRONIC DISEASE: Status: ACTIVE | Noted: 2023-01-01

## 2023-04-12 PROBLEM — C79.9 METASTASIS (HCC): Status: ACTIVE | Noted: 2023-04-12

## 2023-04-12 PROBLEM — R91.8 MASS OF UPPER LOBE OF LEFT LUNG: Status: ACTIVE | Noted: 2023-01-01

## 2023-04-12 PROBLEM — R16.0 LIVER MASSES: Status: ACTIVE | Noted: 2023-04-12

## 2023-04-12 PROBLEM — M89.9 LYTIC BONE LESIONS ON XRAY: Status: ACTIVE | Noted: 2023-01-01

## 2023-04-12 PROBLEM — M89.9 LYTIC BONE LESIONS ON XRAY: Status: ACTIVE | Noted: 2023-04-12

## 2023-04-12 LAB
APPEARANCE UR: ABNORMAL
BACTERIA URNS QL MICRO: NEGATIVE /HPF
BILIRUB UR QL: NEGATIVE
COLOR UR: ABNORMAL
EPITH CASTS URNS QL MICRO: ABNORMAL /LPF
ERYTHROCYTE [DISTWIDTH] IN BLOOD BY AUTOMATED COUNT: 13.7 % (ref 11.5–14.5)
FERRITIN SERPL-MCNC: 538 NG/ML (ref 26–388)
FOLATE SERPL-MCNC: 8.6 NG/ML (ref 5–21)
GLUCOSE UR STRIP.AUTO-MCNC: NEGATIVE MG/DL
HCT VFR BLD AUTO: 31.5 % (ref 36.6–50.3)
HGB BLD-MCNC: 10.5 G/DL (ref 12.1–17)
HGB UR QL STRIP: NEGATIVE
HYALINE CASTS URNS QL MICRO: ABNORMAL /LPF (ref 0–2)
IRON SATN MFR SERPL: 12 % (ref 20–50)
IRON SERPL-MCNC: 25 UG/DL (ref 35–150)
KETONES UR QL STRIP.AUTO: NEGATIVE MG/DL
LEUKOCYTE ESTERASE UR QL STRIP.AUTO: NEGATIVE
MCH RBC QN AUTO: 31.2 PG (ref 26–34)
MCHC RBC AUTO-ENTMCNC: 33.3 G/DL (ref 30–36.5)
MCV RBC AUTO: 93.5 FL (ref 80–99)
NITRITE UR QL STRIP.AUTO: NEGATIVE
NRBC # BLD: 0 K/UL (ref 0–0.01)
NRBC BLD-RTO: 0 PER 100 WBC
PH UR STRIP: 5.5 (ref 5–8)
PLATELET # BLD AUTO: 470 K/UL (ref 150–400)
PMV BLD AUTO: 9 FL (ref 8.9–12.9)
PROT UR STRIP-MCNC: NEGATIVE MG/DL
PSA SERPL-MCNC: 1.8 NG/ML (ref 0.01–4)
RBC # BLD AUTO: 3.37 M/UL (ref 4.1–5.7)
RBC #/AREA URNS HPF: ABNORMAL /HPF (ref 0–5)
RETICS # AUTO: 0.08 M/UL (ref 0.03–0.1)
RETICS/RBC NFR AUTO: 2.1 % (ref 0.7–2.1)
SP GR UR REFRACTOMETRY: 1.02 (ref 1–1.03)
TIBC SERPL-MCNC: 203 UG/DL (ref 250–450)
UA: UC IF INDICATED,UAUC: ABNORMAL
UROBILINOGEN UR QL STRIP.AUTO: 0.2 EU/DL (ref 0.2–1)
VIT B12 SERPL-MCNC: 381 PG/ML (ref 193–986)
WBC # BLD AUTO: 15.5 K/UL (ref 4.1–11.1)
WBC URNS QL MICRO: ABNORMAL /HPF (ref 0–4)

## 2023-04-12 PROCEDURE — 84153 ASSAY OF PSA TOTAL: CPT

## 2023-04-12 PROCEDURE — 81001 URINALYSIS AUTO W/SCOPE: CPT

## 2023-04-12 PROCEDURE — 71045 X-RAY EXAM CHEST 1 VIEW: CPT

## 2023-04-12 PROCEDURE — 88305 TISSUE EXAM BY PATHOLOGIST: CPT

## 2023-04-12 PROCEDURE — 82728 ASSAY OF FERRITIN: CPT

## 2023-04-12 PROCEDURE — 32408 CORE NDL BX LNG/MED PERQ: CPT

## 2023-04-12 PROCEDURE — 0BBG3ZX EXCISION OF LEFT UPPER LUNG LOBE, PERCUTANEOUS APPROACH, DIAGNOSTIC: ICD-10-PCS | Performed by: STUDENT IN AN ORGANIZED HEALTH CARE EDUCATION/TRAINING PROGRAM

## 2023-04-12 PROCEDURE — 88342 IMHCHEM/IMCYTCHM 1ST ANTB: CPT

## 2023-04-12 PROCEDURE — 74011250637 HC RX REV CODE- 250/637: Performed by: INTERNAL MEDICINE

## 2023-04-12 PROCEDURE — 99223 1ST HOSP IP/OBS HIGH 75: CPT | Performed by: INTERNAL MEDICINE

## 2023-04-12 PROCEDURE — 74011250636 HC RX REV CODE- 250/636: Performed by: INTERNAL MEDICINE

## 2023-04-12 PROCEDURE — 84155 ASSAY OF PROTEIN SERUM: CPT

## 2023-04-12 PROCEDURE — 36415 COLL VENOUS BLD VENIPUNCTURE: CPT

## 2023-04-12 PROCEDURE — 74011000250 HC RX REV CODE- 250: Performed by: INTERNAL MEDICINE

## 2023-04-12 PROCEDURE — 82746 ASSAY OF FOLIC ACID SERUM: CPT

## 2023-04-12 PROCEDURE — 74178 CT ABD&PLV WO CNTR FLWD CNTR: CPT

## 2023-04-12 PROCEDURE — 86335 IMMUNFIX E-PHORSIS/URINE/CSF: CPT

## 2023-04-12 PROCEDURE — 74011250636 HC RX REV CODE- 250/636: Performed by: RADIOLOGY

## 2023-04-12 PROCEDURE — 74011250636 HC RX REV CODE- 250/636: Performed by: STUDENT IN AN ORGANIZED HEALTH CARE EDUCATION/TRAINING PROGRAM

## 2023-04-12 PROCEDURE — 72148 MRI LUMBAR SPINE W/O DYE: CPT

## 2023-04-12 PROCEDURE — 77030014115

## 2023-04-12 PROCEDURE — 85027 COMPLETE CBC AUTOMATED: CPT

## 2023-04-12 PROCEDURE — 65270000029 HC RM PRIVATE

## 2023-04-12 PROCEDURE — 85045 AUTOMATED RETICULOCYTE COUNT: CPT

## 2023-04-12 PROCEDURE — 88333 PATH CONSLTJ SURG CYTO XM 1: CPT

## 2023-04-12 PROCEDURE — A9576 INJ PROHANCE MULTIPACK: HCPCS | Performed by: RADIOLOGY

## 2023-04-12 PROCEDURE — 83540 ASSAY OF IRON: CPT

## 2023-04-12 PROCEDURE — 82607 VITAMIN B-12: CPT

## 2023-04-12 PROCEDURE — 72156 MRI NECK SPINE W/O & W/DYE: CPT

## 2023-04-12 PROCEDURE — 72146 MRI CHEST SPINE W/O DYE: CPT

## 2023-04-12 PROCEDURE — 77030003503 HC NDL BIOP TISS BD -B

## 2023-04-12 PROCEDURE — 88341 IMHCHEM/IMCYTCHM EA ADD ANTB: CPT

## 2023-04-12 PROCEDURE — 74011000636 HC RX REV CODE- 636: Performed by: INTERNAL MEDICINE

## 2023-04-12 RX ORDER — ENOXAPARIN SODIUM 100 MG/ML
40 INJECTION SUBCUTANEOUS DAILY
Status: DISCONTINUED | OUTPATIENT
Start: 2023-04-12 | End: 2023-04-15 | Stop reason: HOSPADM

## 2023-04-12 RX ORDER — SODIUM CHLORIDE 0.9 % (FLUSH) 0.9 %
5-40 SYRINGE (ML) INJECTION EVERY 8 HOURS
Status: DISCONTINUED | OUTPATIENT
Start: 2023-04-12 | End: 2023-04-15 | Stop reason: HOSPADM

## 2023-04-12 RX ORDER — ONDANSETRON 2 MG/ML
4 INJECTION INTRAMUSCULAR; INTRAVENOUS
Status: DISCONTINUED | OUTPATIENT
Start: 2023-04-12 | End: 2023-04-15 | Stop reason: HOSPADM

## 2023-04-12 RX ORDER — ACETAMINOPHEN 325 MG/1
650 TABLET ORAL
Status: DISCONTINUED | OUTPATIENT
Start: 2023-04-12 | End: 2023-04-15 | Stop reason: HOSPADM

## 2023-04-12 RX ORDER — POLYETHYLENE GLYCOL 3350 17 G/17G
17 POWDER, FOR SOLUTION ORAL DAILY PRN
Status: DISCONTINUED | OUTPATIENT
Start: 2023-04-12 | End: 2023-04-15 | Stop reason: HOSPADM

## 2023-04-12 RX ORDER — FENTANYL CITRATE 50 UG/ML
25-100 INJECTION, SOLUTION INTRAMUSCULAR; INTRAVENOUS
Status: DISCONTINUED | OUTPATIENT
Start: 2023-04-12 | End: 2023-04-12

## 2023-04-12 RX ORDER — MORPHINE SULFATE 2 MG/ML
2 INJECTION, SOLUTION INTRAMUSCULAR; INTRAVENOUS
Status: DISCONTINUED | OUTPATIENT
Start: 2023-04-12 | End: 2023-04-12 | Stop reason: SDUPTHER

## 2023-04-12 RX ORDER — ONDANSETRON 4 MG/1
4 TABLET, ORALLY DISINTEGRATING ORAL
Status: DISCONTINUED | OUTPATIENT
Start: 2023-04-12 | End: 2023-04-15 | Stop reason: HOSPADM

## 2023-04-12 RX ORDER — SODIUM CHLORIDE, SODIUM LACTATE, POTASSIUM CHLORIDE, CALCIUM CHLORIDE 600; 310; 30; 20 MG/100ML; MG/100ML; MG/100ML; MG/100ML
75 INJECTION, SOLUTION INTRAVENOUS CONTINUOUS
Status: DISCONTINUED | OUTPATIENT
Start: 2023-04-12 | End: 2023-04-13

## 2023-04-12 RX ORDER — ACETAMINOPHEN 650 MG/1
650 SUPPOSITORY RECTAL
Status: DISCONTINUED | OUTPATIENT
Start: 2023-04-12 | End: 2023-04-15 | Stop reason: HOSPADM

## 2023-04-12 RX ORDER — SODIUM CHLORIDE 0.9 % (FLUSH) 0.9 %
5-40 SYRINGE (ML) INJECTION AS NEEDED
Status: DISCONTINUED | OUTPATIENT
Start: 2023-04-12 | End: 2023-04-15 | Stop reason: HOSPADM

## 2023-04-12 RX ORDER — POTASSIUM CHLORIDE 750 MG/1
40 TABLET, FILM COATED, EXTENDED RELEASE ORAL
Status: COMPLETED | OUTPATIENT
Start: 2023-04-12 | End: 2023-04-12

## 2023-04-12 RX ORDER — KETOROLAC TROMETHAMINE 30 MG/ML
30 INJECTION, SOLUTION INTRAMUSCULAR; INTRAVENOUS
Status: DISCONTINUED | OUTPATIENT
Start: 2023-04-12 | End: 2023-04-15 | Stop reason: HOSPADM

## 2023-04-12 RX ORDER — HYDROMORPHONE HYDROCHLORIDE 1 MG/ML
1 INJECTION, SOLUTION INTRAMUSCULAR; INTRAVENOUS; SUBCUTANEOUS
Status: DISCONTINUED | OUTPATIENT
Start: 2023-04-12 | End: 2023-04-15 | Stop reason: HOSPADM

## 2023-04-12 RX ORDER — MIDAZOLAM HYDROCHLORIDE 1 MG/ML
.5-5 INJECTION, SOLUTION INTRAMUSCULAR; INTRAVENOUS
Status: DISCONTINUED | OUTPATIENT
Start: 2023-04-12 | End: 2023-04-12

## 2023-04-12 RX ORDER — HYDROCODONE BITARTRATE AND ACETAMINOPHEN 10; 325 MG/1; MG/1
1 TABLET ORAL
Status: DISCONTINUED | OUTPATIENT
Start: 2023-04-12 | End: 2023-04-14

## 2023-04-12 RX ADMIN — KETOROLAC TROMETHAMINE 30 MG: 30 INJECTION, SOLUTION INTRAMUSCULAR at 14:26

## 2023-04-12 RX ADMIN — Medication 10 ML: at 23:00

## 2023-04-12 RX ADMIN — IOPAMIDOL 100 ML: 755 INJECTION, SOLUTION INTRAVENOUS at 09:22

## 2023-04-12 RX ADMIN — FENTANYL CITRATE 25 MCG: 50 INJECTION, SOLUTION INTRAMUSCULAR; INTRAVENOUS at 09:45

## 2023-04-12 RX ADMIN — MIDAZOLAM 1 MG: 1 INJECTION INTRAMUSCULAR; INTRAVENOUS at 09:41

## 2023-04-12 RX ADMIN — Medication 10 ML: at 06:30

## 2023-04-12 RX ADMIN — POTASSIUM CHLORIDE 40 MEQ: 750 TABLET, FILM COATED, EXTENDED RELEASE ORAL at 12:08

## 2023-04-12 RX ADMIN — SODIUM CHLORIDE, POTASSIUM CHLORIDE, SODIUM LACTATE AND CALCIUM CHLORIDE 125 ML/HR: 600; 310; 30; 20 INJECTION, SOLUTION INTRAVENOUS at 12:39

## 2023-04-12 RX ADMIN — Medication 10 ML: at 06:32

## 2023-04-12 RX ADMIN — MIDAZOLAM 1 MG: 1 INJECTION INTRAMUSCULAR; INTRAVENOUS at 09:45

## 2023-04-12 RX ADMIN — MORPHINE SULFATE 2 MG: 2 INJECTION, SOLUTION INTRAMUSCULAR; INTRAVENOUS at 13:29

## 2023-04-12 RX ADMIN — SODIUM CHLORIDE, POTASSIUM CHLORIDE, SODIUM LACTATE AND CALCIUM CHLORIDE 125 ML/HR: 600; 310; 30; 20 INJECTION, SOLUTION INTRAVENOUS at 00:55

## 2023-04-12 RX ADMIN — FENTANYL CITRATE 25 MCG: 50 INJECTION, SOLUTION INTRAMUSCULAR; INTRAVENOUS at 09:55

## 2023-04-12 RX ADMIN — GADOTERIDOL 19 ML: 279.3 INJECTION, SOLUTION INTRAVENOUS at 22:33

## 2023-04-12 RX ADMIN — MIDAZOLAM 1 MG: 1 INJECTION INTRAMUSCULAR; INTRAVENOUS at 09:55

## 2023-04-12 RX ADMIN — FENTANYL CITRATE 25 MCG: 50 INJECTION, SOLUTION INTRAMUSCULAR; INTRAVENOUS at 09:41

## 2023-04-12 RX ADMIN — HYDROMORPHONE HYDROCHLORIDE 1 MG: 1 INJECTION, SOLUTION INTRAMUSCULAR; INTRAVENOUS; SUBCUTANEOUS at 16:26

## 2023-04-12 RX ADMIN — MORPHINE SULFATE 2 MG: 2 INJECTION, SOLUTION INTRAMUSCULAR; INTRAVENOUS at 06:31

## 2023-04-12 RX ADMIN — HYDROMORPHONE HYDROCHLORIDE 1 MG: 1 INJECTION, SOLUTION INTRAMUSCULAR; INTRAVENOUS; SUBCUTANEOUS at 23:46

## 2023-04-13 LAB
ALBUMIN SERPL ELPH-MCNC: 2.9 G/DL (ref 2.9–4.4)
ALBUMIN/GLOB SERPL: 0.9 (ref 0.7–1.7)
ALPHA1 GLOB SERPL ELPH-MCNC: 0.4 G/DL (ref 0–0.4)
ALPHA2 GLOB SERPL ELPH-MCNC: 0.9 G/DL (ref 0.4–1)
ANION GAP SERPL CALC-SCNC: 2 MMOL/L (ref 5–15)
B-GLOBULIN SERPL ELPH-MCNC: 1.1 G/DL (ref 0.7–1.3)
BASOPHILS # BLD: 0.1 K/UL (ref 0–0.1)
BASOPHILS NFR BLD: 1 % (ref 0–1)
BUN SERPL-MCNC: 18 MG/DL (ref 6–20)
BUN/CREAT SERPL: 23 (ref 12–20)
CALCIUM SERPL-MCNC: 8.8 MG/DL (ref 8.5–10.1)
CHLORIDE SERPL-SCNC: 107 MMOL/L (ref 97–108)
CO2 SERPL-SCNC: 30 MMOL/L (ref 21–32)
CREAT SERPL-MCNC: 0.78 MG/DL (ref 0.7–1.3)
DIFFERENTIAL METHOD BLD: ABNORMAL
EOSINOPHIL # BLD: 0.2 K/UL (ref 0–0.4)
EOSINOPHIL NFR BLD: 1 % (ref 0–7)
ERYTHROCYTE [DISTWIDTH] IN BLOOD BY AUTOMATED COUNT: 13.6 % (ref 11.5–14.5)
GAMMA GLOB SERPL ELPH-MCNC: 1 G/DL (ref 0.4–1.8)
GLOBULIN SER CALC-MCNC: 3.4 G/DL (ref 2.2–3.9)
GLUCOSE SERPL-MCNC: 107 MG/DL (ref 65–100)
HCT VFR BLD AUTO: 33.8 % (ref 36.6–50.3)
HGB BLD-MCNC: 11.2 G/DL (ref 12.1–17)
IMM GRANULOCYTES # BLD AUTO: 0.1 K/UL (ref 0–0.04)
IMM GRANULOCYTES NFR BLD AUTO: 1 % (ref 0–0.5)
LDH SERPL L TO P-CCNC: 946 U/L (ref 85–241)
LYMPHOCYTES # BLD: 1.6 K/UL (ref 0.8–3.5)
LYMPHOCYTES NFR BLD: 10 % (ref 12–49)
M PROTEIN SERPL ELPH-MCNC: NORMAL G/DL
MAGNESIUM SERPL-MCNC: 2 MG/DL (ref 1.6–2.4)
MCH RBC QN AUTO: 31 PG (ref 26–34)
MCHC RBC AUTO-ENTMCNC: 33.1 G/DL (ref 30–36.5)
MCV RBC AUTO: 93.6 FL (ref 80–99)
MONOCYTES # BLD: 0.8 K/UL (ref 0–1)
MONOCYTES NFR BLD: 5 % (ref 5–13)
NEUTS SEG # BLD: 14.2 K/UL (ref 1.8–8)
NEUTS SEG NFR BLD: 82 % (ref 32–75)
NRBC # BLD: 0 K/UL (ref 0–0.01)
NRBC BLD-RTO: 0 PER 100 WBC
PLATELET # BLD AUTO: 477 K/UL (ref 150–400)
PMV BLD AUTO: 9.2 FL (ref 8.9–12.9)
POTASSIUM SERPL-SCNC: 3.5 MMOL/L (ref 3.5–5.1)
PROT SERPL-MCNC: 6.3 G/DL (ref 6–8.5)
RBC # BLD AUTO: 3.61 M/UL (ref 4.1–5.7)
SODIUM SERPL-SCNC: 139 MMOL/L (ref 136–145)
URATE SERPL-MCNC: 5.9 MG/DL (ref 3.5–7.2)
WBC # BLD AUTO: 17 K/UL (ref 4.1–11.1)

## 2023-04-13 PROCEDURE — 85025 COMPLETE CBC W/AUTO DIFF WBC: CPT

## 2023-04-13 PROCEDURE — 83615 LACTATE (LD) (LDH) ENZYME: CPT

## 2023-04-13 PROCEDURE — 84550 ASSAY OF BLOOD/URIC ACID: CPT

## 2023-04-13 PROCEDURE — 74011250636 HC RX REV CODE- 250/636: Performed by: INTERNAL MEDICINE

## 2023-04-13 PROCEDURE — 99223 1ST HOSP IP/OBS HIGH 75: CPT | Performed by: PHYSICAL MEDICINE & REHABILITATION

## 2023-04-13 PROCEDURE — 74011000250 HC RX REV CODE- 250: Performed by: INTERNAL MEDICINE

## 2023-04-13 PROCEDURE — 80048 BASIC METABOLIC PNL TOTAL CA: CPT

## 2023-04-13 PROCEDURE — 36415 COLL VENOUS BLD VENIPUNCTURE: CPT

## 2023-04-13 PROCEDURE — 74011250637 HC RX REV CODE- 250/637: Performed by: NURSE PRACTITIONER

## 2023-04-13 PROCEDURE — 74011250637 HC RX REV CODE- 250/637: Performed by: PHYSICAL MEDICINE & REHABILITATION

## 2023-04-13 PROCEDURE — 83735 ASSAY OF MAGNESIUM: CPT

## 2023-04-13 PROCEDURE — 65270000029 HC RM PRIVATE

## 2023-04-13 RX ORDER — AMOXICILLIN 250 MG
2 CAPSULE ORAL
Status: DISCONTINUED | OUTPATIENT
Start: 2023-04-13 | End: 2023-04-15 | Stop reason: HOSPADM

## 2023-04-13 RX ORDER — OXYCODONE HCL 10 MG/1
10 TABLET, FILM COATED, EXTENDED RELEASE ORAL EVERY 12 HOURS
Status: DISCONTINUED | OUTPATIENT
Start: 2023-04-13 | End: 2023-04-15

## 2023-04-13 RX ORDER — POLYETHYLENE GLYCOL 3350 17 G/17G
17 POWDER, FOR SOLUTION ORAL DAILY
Status: DISCONTINUED | OUTPATIENT
Start: 2023-04-14 | End: 2023-04-15 | Stop reason: HOSPADM

## 2023-04-13 RX ADMIN — ENOXAPARIN SODIUM 40 MG: 100 INJECTION SUBCUTANEOUS at 10:45

## 2023-04-13 RX ADMIN — OXYCODONE HYDROCHLORIDE 10 MG: 10 TABLET, FILM COATED, EXTENDED RELEASE ORAL at 15:33

## 2023-04-13 RX ADMIN — HYDROMORPHONE HYDROCHLORIDE 1 MG: 1 INJECTION, SOLUTION INTRAMUSCULAR; INTRAVENOUS; SUBCUTANEOUS at 10:46

## 2023-04-13 RX ADMIN — SODIUM CHLORIDE, POTASSIUM CHLORIDE, SODIUM LACTATE AND CALCIUM CHLORIDE 75 ML/HR: 600; 310; 30; 20 INJECTION, SOLUTION INTRAVENOUS at 00:40

## 2023-04-13 RX ADMIN — Medication 10 ML: at 05:02

## 2023-04-13 RX ADMIN — SODIUM CHLORIDE, POTASSIUM CHLORIDE, SODIUM LACTATE AND CALCIUM CHLORIDE 75 ML/HR: 600; 310; 30; 20 INJECTION, SOLUTION INTRAVENOUS at 15:54

## 2023-04-13 RX ADMIN — HYDROMORPHONE HYDROCHLORIDE 1 MG: 1 INJECTION, SOLUTION INTRAMUSCULAR; INTRAVENOUS; SUBCUTANEOUS at 17:52

## 2023-04-13 RX ADMIN — HYDROMORPHONE HYDROCHLORIDE 1 MG: 1 INJECTION, SOLUTION INTRAMUSCULAR; INTRAVENOUS; SUBCUTANEOUS at 06:24

## 2023-04-13 RX ADMIN — DOCUSATE SODIUM 50MG AND SENNOSIDES 8.6MG 2 TABLET: 8.6; 5 TABLET, FILM COATED ORAL at 21:41

## 2023-04-13 RX ADMIN — Medication 10 ML: at 21:29

## 2023-04-14 ENCOUNTER — APPOINTMENT (OUTPATIENT)
Dept: CT IMAGING | Age: 53
DRG: 180 | End: 2023-04-14
Attending: NURSE PRACTITIONER
Payer: COMMERCIAL

## 2023-04-14 ENCOUNTER — APPOINTMENT (OUTPATIENT)
Dept: NUCLEAR MEDICINE | Age: 53
DRG: 180 | End: 2023-04-14
Attending: INTERNAL MEDICINE
Payer: COMMERCIAL

## 2023-04-14 ENCOUNTER — APPOINTMENT (OUTPATIENT)
Dept: MRI IMAGING | Age: 53
DRG: 180 | End: 2023-04-14
Attending: NURSE PRACTITIONER
Payer: COMMERCIAL

## 2023-04-14 LAB
ANION GAP SERPL CALC-SCNC: 0 MMOL/L (ref 5–15)
BASOPHILS # BLD: 0.1 K/UL (ref 0–0.1)
BASOPHILS NFR BLD: 1 % (ref 0–1)
BUN SERPL-MCNC: 21 MG/DL (ref 6–20)
BUN/CREAT SERPL: 23 (ref 12–20)
CALCIUM SERPL-MCNC: 9.1 MG/DL (ref 8.5–10.1)
CHLORIDE SERPL-SCNC: 105 MMOL/L (ref 97–108)
CO2 SERPL-SCNC: 32 MMOL/L (ref 21–32)
CREAT SERPL-MCNC: 0.91 MG/DL (ref 0.7–1.3)
DIFFERENTIAL METHOD BLD: ABNORMAL
EOSINOPHIL # BLD: 0.2 K/UL (ref 0–0.4)
EOSINOPHIL NFR BLD: 1 % (ref 0–7)
ERYTHROCYTE [DISTWIDTH] IN BLOOD BY AUTOMATED COUNT: 13.7 % (ref 11.5–14.5)
GLUCOSE SERPL-MCNC: 124 MG/DL (ref 65–100)
HCT VFR BLD AUTO: 38 % (ref 36.6–50.3)
HGB BLD-MCNC: 12.5 G/DL (ref 12.1–17)
IMM GRANULOCYTES # BLD AUTO: 0.1 K/UL (ref 0–0.04)
IMM GRANULOCYTES NFR BLD AUTO: 1 % (ref 0–0.5)
LYMPHOCYTES # BLD: 2 K/UL (ref 0.8–3.5)
LYMPHOCYTES NFR BLD: 11 % (ref 12–49)
MCH RBC QN AUTO: 30.4 PG (ref 26–34)
MCHC RBC AUTO-ENTMCNC: 32.9 G/DL (ref 30–36.5)
MCV RBC AUTO: 92.5 FL (ref 80–99)
MONOCYTES # BLD: 0.8 K/UL (ref 0–1)
MONOCYTES NFR BLD: 4 % (ref 5–13)
NEUTS SEG # BLD: 15.5 K/UL (ref 1.8–8)
NEUTS SEG NFR BLD: 82 % (ref 32–75)
NRBC # BLD: 0 K/UL (ref 0–0.01)
NRBC BLD-RTO: 0 PER 100 WBC
PLATELET # BLD AUTO: 550 K/UL (ref 150–400)
PMV BLD AUTO: 9 FL (ref 8.9–12.9)
POTASSIUM SERPL-SCNC: 4.2 MMOL/L (ref 3.5–5.1)
RBC # BLD AUTO: 4.11 M/UL (ref 4.1–5.7)
SODIUM SERPL-SCNC: 137 MMOL/L (ref 136–145)
WBC # BLD AUTO: 18.6 K/UL (ref 4.1–11.1)

## 2023-04-14 PROCEDURE — 80048 BASIC METABOLIC PNL TOTAL CA: CPT

## 2023-04-14 PROCEDURE — 74011000636 HC RX REV CODE- 636: Performed by: INTERNAL MEDICINE

## 2023-04-14 PROCEDURE — 74011250636 HC RX REV CODE- 250/636: Performed by: NURSE PRACTITIONER

## 2023-04-14 PROCEDURE — 99233 SBSQ HOSP IP/OBS HIGH 50: CPT | Performed by: INTERNAL MEDICINE

## 2023-04-14 PROCEDURE — 74011250637 HC RX REV CODE- 250/637: Performed by: INTERNAL MEDICINE

## 2023-04-14 PROCEDURE — 70553 MRI BRAIN STEM W/O & W/DYE: CPT

## 2023-04-14 PROCEDURE — 74011000250 HC RX REV CODE- 250: Performed by: INTERNAL MEDICINE

## 2023-04-14 PROCEDURE — 71260 CT THORAX DX C+: CPT

## 2023-04-14 PROCEDURE — 74011250637 HC RX REV CODE- 250/637: Performed by: PHYSICAL MEDICINE & REHABILITATION

## 2023-04-14 PROCEDURE — A9503 TC99M MEDRONATE: HCPCS

## 2023-04-14 PROCEDURE — 99233 SBSQ HOSP IP/OBS HIGH 50: CPT | Performed by: PHYSICAL MEDICINE & REHABILITATION

## 2023-04-14 PROCEDURE — 65270000029 HC RM PRIVATE

## 2023-04-14 PROCEDURE — A9576 INJ PROHANCE MULTIPACK: HCPCS | Performed by: RADIOLOGY

## 2023-04-14 PROCEDURE — 36415 COLL VENOUS BLD VENIPUNCTURE: CPT

## 2023-04-14 PROCEDURE — 74011250636 HC RX REV CODE- 250/636: Performed by: INTERNAL MEDICINE

## 2023-04-14 PROCEDURE — 85025 COMPLETE CBC W/AUTO DIFF WBC: CPT

## 2023-04-14 PROCEDURE — 74011250637 HC RX REV CODE- 250/637: Performed by: NURSE PRACTITIONER

## 2023-04-14 PROCEDURE — 74011250636 HC RX REV CODE- 250/636: Performed by: RADIOLOGY

## 2023-04-14 RX ORDER — HYDRALAZINE HYDROCHLORIDE 20 MG/ML
10 INJECTION INTRAMUSCULAR; INTRAVENOUS
Status: DISCONTINUED | OUTPATIENT
Start: 2023-04-14 | End: 2023-04-15 | Stop reason: HOSPADM

## 2023-04-14 RX ORDER — MORPHINE SULFATE 15 MG/1
15 TABLET, FILM COATED, EXTENDED RELEASE ORAL EVERY 12 HOURS
Qty: 60 TABLET | Refills: 0 | Status: SHIPPED | OUTPATIENT
Start: 2023-04-14 | End: 2023-05-14

## 2023-04-14 RX ORDER — AMLODIPINE BESYLATE 5 MG/1
5 TABLET ORAL DAILY
Status: DISCONTINUED | OUTPATIENT
Start: 2023-04-14 | End: 2023-04-15 | Stop reason: HOSPADM

## 2023-04-14 RX ORDER — OXYCODONE HYDROCHLORIDE 15 MG/1
15 TABLET ORAL
Qty: 84 TABLET | Refills: 0 | Status: SHIPPED | OUTPATIENT
Start: 2023-04-14 | End: 2023-04-28

## 2023-04-14 RX ORDER — OXYCODONE HYDROCHLORIDE 5 MG/1
15 TABLET ORAL
Status: DISCONTINUED | OUTPATIENT
Start: 2023-04-14 | End: 2023-04-15 | Stop reason: HOSPADM

## 2023-04-14 RX ORDER — DEXAMETHASONE 4 MG/1
4 TABLET ORAL EVERY 6 HOURS
Status: DISCONTINUED | OUTPATIENT
Start: 2023-04-14 | End: 2023-04-15 | Stop reason: HOSPADM

## 2023-04-14 RX ORDER — OXYCODONE HCL 10 MG/1
10 TABLET, FILM COATED, EXTENDED RELEASE ORAL EVERY 12 HOURS
Qty: 60 TABLET | Refills: 0 | Status: SHIPPED | OUTPATIENT
Start: 2023-04-14 | End: 2023-04-15

## 2023-04-14 RX ORDER — MORPHINE SULFATE 15 MG/1
15 TABLET, FILM COATED, EXTENDED RELEASE ORAL EVERY 12 HOURS
Status: DISCONTINUED | OUTPATIENT
Start: 2023-04-14 | End: 2023-04-15 | Stop reason: HOSPADM

## 2023-04-14 RX ADMIN — AMLODIPINE BESYLATE 5 MG: 5 TABLET ORAL at 09:13

## 2023-04-14 RX ADMIN — Medication 10 ML: at 05:03

## 2023-04-14 RX ADMIN — POLYETHYLENE GLYCOL 3350 17 G: 17 POWDER, FOR SOLUTION ORAL at 08:51

## 2023-04-14 RX ADMIN — DEXAMETHASONE 4 MG: 4 TABLET ORAL at 13:59

## 2023-04-14 RX ADMIN — DOCUSATE SODIUM 50MG AND SENNOSIDES 8.6MG 2 TABLET: 8.6; 5 TABLET, FILM COATED ORAL at 22:23

## 2023-04-14 RX ADMIN — MORPHINE SULFATE 15 MG: 15 TABLET, FILM COATED, EXTENDED RELEASE ORAL at 17:11

## 2023-04-14 RX ADMIN — Medication 10 ML: at 14:00

## 2023-04-14 RX ADMIN — IOPAMIDOL 80 ML: 612 INJECTION, SOLUTION INTRAVENOUS at 10:19

## 2023-04-14 RX ADMIN — ENOXAPARIN SODIUM 40 MG: 100 INJECTION SUBCUTANEOUS at 08:50

## 2023-04-14 RX ADMIN — OXYCODONE HYDROCHLORIDE 10 MG: 10 TABLET, FILM COATED, EXTENDED RELEASE ORAL at 01:16

## 2023-04-14 RX ADMIN — OXYCODONE HYDROCHLORIDE 10 MG: 10 TABLET, FILM COATED, EXTENDED RELEASE ORAL at 13:59

## 2023-04-14 RX ADMIN — GADOTERIDOL 19 ML: 279.3 INJECTION, SOLUTION INTRAVENOUS at 10:00

## 2023-04-14 RX ADMIN — DEXAMETHASONE 4 MG: 4 TABLET ORAL at 17:11

## 2023-04-14 RX ADMIN — Medication 10 ML: at 22:23

## 2023-04-15 VITALS
TEMPERATURE: 97.7 F | HEIGHT: 66 IN | WEIGHT: 210 LBS | DIASTOLIC BLOOD PRESSURE: 99 MMHG | SYSTOLIC BLOOD PRESSURE: 154 MMHG | HEART RATE: 104 BPM | RESPIRATION RATE: 16 BRPM | BODY MASS INDEX: 33.75 KG/M2 | OXYGEN SATURATION: 95 %

## 2023-04-15 LAB
ANION GAP SERPL CALC-SCNC: 2 MMOL/L (ref 5–15)
BASOPHILS # BLD: 0 K/UL (ref 0–0.1)
BASOPHILS NFR BLD: 0 % (ref 0–1)
BNP SERPL-MCNC: 145 PG/ML
BUN SERPL-MCNC: 21 MG/DL (ref 6–20)
BUN/CREAT SERPL: 26 (ref 12–20)
CALCIUM SERPL-MCNC: 9 MG/DL (ref 8.5–10.1)
CHLORIDE SERPL-SCNC: 105 MMOL/L (ref 97–108)
CO2 SERPL-SCNC: 30 MMOL/L (ref 21–32)
CREAT SERPL-MCNC: 0.81 MG/DL (ref 0.7–1.3)
DIFFERENTIAL METHOD BLD: ABNORMAL
EOSINOPHIL # BLD: 0 K/UL (ref 0–0.4)
EOSINOPHIL NFR BLD: 0 % (ref 0–7)
ERYTHROCYTE [DISTWIDTH] IN BLOOD BY AUTOMATED COUNT: 13.7 % (ref 11.5–14.5)
GLUCOSE SERPL-MCNC: 141 MG/DL (ref 65–100)
HCT VFR BLD AUTO: 33.4 % (ref 36.6–50.3)
HGB BLD-MCNC: 11.1 G/DL (ref 12.1–17)
IMM GRANULOCYTES # BLD AUTO: 0.2 K/UL (ref 0–0.04)
IMM GRANULOCYTES NFR BLD AUTO: 1 % (ref 0–0.5)
LYMPHOCYTES # BLD: 1.2 K/UL (ref 0.8–3.5)
LYMPHOCYTES NFR BLD: 7 % (ref 12–49)
MAGNESIUM SERPL-MCNC: 2.2 MG/DL (ref 1.6–2.4)
MCH RBC QN AUTO: 30.9 PG (ref 26–34)
MCHC RBC AUTO-ENTMCNC: 33.2 G/DL (ref 30–36.5)
MCV RBC AUTO: 93 FL (ref 80–99)
MONOCYTES # BLD: 0.3 K/UL (ref 0–1)
MONOCYTES NFR BLD: 2 % (ref 5–13)
NEUTS SEG # BLD: 15.6 K/UL (ref 1.8–8)
NEUTS SEG NFR BLD: 90 % (ref 32–75)
NRBC # BLD: 0 K/UL (ref 0–0.01)
NRBC BLD-RTO: 0 PER 100 WBC
PLATELET # BLD AUTO: 526 K/UL (ref 150–400)
PMV BLD AUTO: 9.1 FL (ref 8.9–12.9)
POTASSIUM SERPL-SCNC: 4 MMOL/L (ref 3.5–5.1)
PROCALCITONIN SERPL-MCNC: <0.05 NG/ML
RBC # BLD AUTO: 3.59 M/UL (ref 4.1–5.7)
RBC MORPH BLD: ABNORMAL
SODIUM SERPL-SCNC: 137 MMOL/L (ref 136–145)
WBC # BLD AUTO: 17.3 K/UL (ref 4.1–11.1)

## 2023-04-15 PROCEDURE — 85025 COMPLETE CBC W/AUTO DIFF WBC: CPT

## 2023-04-15 PROCEDURE — 36415 COLL VENOUS BLD VENIPUNCTURE: CPT

## 2023-04-15 PROCEDURE — 74011250637 HC RX REV CODE- 250/637: Performed by: INTERNAL MEDICINE

## 2023-04-15 PROCEDURE — 80048 BASIC METABOLIC PNL TOTAL CA: CPT

## 2023-04-15 PROCEDURE — 99233 SBSQ HOSP IP/OBS HIGH 50: CPT | Performed by: INTERNAL MEDICINE

## 2023-04-15 PROCEDURE — 83735 ASSAY OF MAGNESIUM: CPT

## 2023-04-15 PROCEDURE — 84145 PROCALCITONIN (PCT): CPT

## 2023-04-15 PROCEDURE — 74011250636 HC RX REV CODE- 250/636: Performed by: NURSE PRACTITIONER

## 2023-04-15 PROCEDURE — 83880 ASSAY OF NATRIURETIC PEPTIDE: CPT

## 2023-04-15 PROCEDURE — 74011250637 HC RX REV CODE- 250/637: Performed by: PHYSICAL MEDICINE & REHABILITATION

## 2023-04-15 PROCEDURE — 74011000250 HC RX REV CODE- 250: Performed by: INTERNAL MEDICINE

## 2023-04-15 RX ORDER — AMLODIPINE BESYLATE 5 MG/1
5 TABLET ORAL DAILY
Qty: 30 TABLET | Refills: 0 | Status: SHIPPED | OUTPATIENT
Start: 2023-04-15 | End: 2023-05-15

## 2023-04-15 RX ORDER — DEXAMETHASONE 4 MG/1
4 TABLET ORAL 2 TIMES DAILY WITH MEALS
Qty: 20 TABLET | Refills: 0 | Status: SHIPPED | OUTPATIENT
Start: 2023-04-15 | End: 2023-04-25

## 2023-04-15 RX ORDER — PANTOPRAZOLE SODIUM 40 MG/1
40 TABLET, DELAYED RELEASE ORAL DAILY
Qty: 10 TABLET | Refills: 0 | Status: SHIPPED | OUTPATIENT
Start: 2023-04-15 | End: 2023-04-25

## 2023-04-15 RX ORDER — NALOXONE HYDROCHLORIDE 4 MG/.1ML
SPRAY NASAL
Qty: 1 EACH | Refills: 0 | Status: SHIPPED | OUTPATIENT
Start: 2023-04-15

## 2023-04-15 RX ADMIN — DEXAMETHASONE 4 MG: 4 TABLET ORAL at 00:59

## 2023-04-15 RX ADMIN — MORPHINE SULFATE 15 MG: 15 TABLET, FILM COATED, EXTENDED RELEASE ORAL at 09:09

## 2023-04-15 RX ADMIN — OXYCODONE HYDROCHLORIDE 10 MG: 10 TABLET, FILM COATED, EXTENDED RELEASE ORAL at 01:24

## 2023-04-15 RX ADMIN — POLYETHYLENE GLYCOL 3350 17 G: 17 POWDER, FOR SOLUTION ORAL at 09:10

## 2023-04-15 RX ADMIN — Medication 10 ML: at 05:44

## 2023-04-15 RX ADMIN — DEXAMETHASONE 4 MG: 4 TABLET ORAL at 06:20

## 2023-04-15 RX ADMIN — AMLODIPINE BESYLATE 5 MG: 5 TABLET ORAL at 09:09

## 2023-04-17 ENCOUNTER — TELEPHONE (OUTPATIENT)
Dept: ONCOLOGY | Age: 53
End: 2023-04-17

## 2023-04-17 ENCOUNTER — TRANSCRIBE ORDER (OUTPATIENT)
Dept: SCHEDULING | Age: 53
End: 2023-04-17

## 2023-04-17 ENCOUNTER — TELEPHONE (OUTPATIENT)
Dept: PALLATIVE CARE | Age: 53
End: 2023-04-17

## 2023-04-17 DIAGNOSIS — C78.7 SECONDARY MALIGNANT NEOPLASM OF LIVER (HCC): Primary | ICD-10-CM

## 2023-04-17 DIAGNOSIS — C78.7 NON-SMALL CELL LUNG CANCER METASTATIC TO LIVER (HCC): Primary | ICD-10-CM

## 2023-04-17 DIAGNOSIS — C34.90 NON-SMALL CELL LUNG CANCER METASTATIC TO LIVER (HCC): Primary | ICD-10-CM

## 2023-04-17 NOTE — TELEPHONE ENCOUNTER
[facility] Palliative Medicine Office  Nursing Note  [phone]  Fax [phone]      Name:  Patty Jacobson  YOB: 1970    Received outpatient Palliative Medicine referral from Dr. Kelsie Montgomery to see patient for symptom management and supportive care. Chart  reviewed. Patty Jacobson is a 50 y.o. male with newly diagnosed metastatic cancer. Patient was hospitalized at [facility]  4/11/23-4/15 23 and was seen by inpatient Palliative Medicine team during his hospitalization. Patient has a past history of HTN and tobacco use. He was admitted to [facility] with ongoing low back pain that started last month. He was found to have diffuse pulmonary nodules, widespread spinal mets, hepatic mets and peritoneal carcinomatosis. MRI brain 4/14 showed innumerable mets. MALI lung biopsy on 4/12/23 was positive for adenocarcinoma, favor lung primary. ACP:     No ACP documents on file    Nurse called patient and [facility] outpatient Palliative Medicine services. Patient says he is feeling overwhelmed today. His wife is currently hospitalized with cardiac issues. He is trying to get other appointments scheduled (liver biopsy). Outpatient Palliative appointment scheduled for first available, 5/9/23 at 10:30am with Dr. Little Seen.       MARINA Hernandez, RN-BC, [facility]

## 2023-04-17 NOTE — TELEPHONE ENCOUNTER
Inderjit Reyes from [facility] central scheduling called stating that the patient CT guided biopsy needs to be changed to an ultrasound guided biopsy.        CB# [phone]

## 2023-04-17 NOTE — TELEPHONE ENCOUNTER
Moriah Alanis  [phone]    04/17/23 9:49 AM - Attempted to call the patient to see if he is able to come in to the office today for [id number] testing. There was no answer. Left a voicemail for the patient to call back at their earliest convenience along with the phone number to our office. Moriah Alanis  [phone]    04/19/23 10:23 AM - Called and spoke with the patient. Patient's ID verified x 2. Advised we would like him to come to the office for his Guardant testing. Advised this is the test that looks at the cancer DNA in his blood. He states his wife is currently in [address] our lab is closed from 12:30-1:30 for lunch. The patient states he will plan to come over to our building around 2 PM today. No further questions or concerns. Moriah Alanis  [phone]    04/20/23 2:44 PM - Attempted to reach the patient as he did not come to the office yesterday for Guardant testing. There was no answer. Left a voicemail for the patient to call back at their earliest convenience along with the phone number to our office.

## 2023-04-17 NOTE — TELEPHONE ENCOUNTER
[address] at [place]  [phone]    04/17/23 9:51 AM - Francesco Neat and spoke with [facility] in the Radiology Department. Inquired if they can move the patient's liver biopsy appointment up from the 27th. [facility] states see will have to check with the ultrasound department and someone will call back. Provided this nurse's direct extension. [facility] advised a new order will need to be placed for an ultrasound guided liver biopsy. Advised this nurse will check with the team and see if they can go ahead and place the new order. [facility] voiced understanding and gratitude for the call. No further questions or concerns. [address] at [place]  [phone]    04/17/23 10:21 AM - Received call from [facility]. She states she spoke with the ultrasound department. 4/27/23 is the soonest they can do for the ultrasound guided liver biopsy. They only do 2 per day. [facility] for her time. No further questions or concerns.

## 2023-04-18 ENCOUNTER — DOCUMENTATION ONLY (OUTPATIENT)
Dept: PALLATIVE CARE | Age: 53
End: 2023-04-18

## 2023-04-18 ENCOUNTER — TELEPHONE (OUTPATIENT)
Dept: ONCOLOGY | Age: 53
End: 2023-04-18

## 2023-04-18 NOTE — PROGRESS NOTES
Palliative Medicine    Returned call from [facility] [phone] as MSContin just went through (I think insurance just approved it?)- pharmacist concerned about pt receiving this as well as Oxycodone- but when explained new diagnosis of widely metastatic adenocarcinoma and that I am monitoring his medications in our clinic he understood situation. Pharmacy called and let pt know medication ready for  and in addition to verbal instructions on these medications, did palliative care discharge note when he left [facility].

## 2023-04-18 NOTE — PROGRESS NOTES
Review of patient chart PA for MS Contin 15 mg was approved 4/14/2023, dated approval scanned in chart,  pharmacy notified to reprocess ,  patients RX reprocessed and medication approved , pharmacy currently filling for patient.  V/m left for patient informing of Rx to  at local pharmacy

## 2023-04-18 NOTE — PROGRESS NOTES
Palliative medicine~    Reached out to Mr Francis Mosley to see how his pain was doing from hospital stay when I saw him at [facility]- was discharged 4/15/23. His pain is controlled w/ Oxycodone 15mg every 4h prn. Unfortunately although my office worked on it the day before discharge, his MSContin 15mg was never approved. He feels that he is okay w/ staying on top of the pain w/ the Oxycodone for now. He is going to run out of Oxycodone before his appointment w/ my colleague dr Vicki Whitten on 5/9/23 at 1030am. He understands to call our office [phone] several days before he runs out and we will refill.

## 2023-04-20 ENCOUNTER — HOSPITAL ENCOUNTER (OUTPATIENT)
Dept: RADIATION THERAPY | Age: 53
Discharge: HOME | End: 2023-04-20
Payer: COMMERCIAL

## 2023-04-20 PROCEDURE — 77300 RADIATION THERAPY DOSE PLAN: CPT

## 2023-04-20 PROCEDURE — 77290 THER RAD SIMULAJ FIELD CPLX: CPT

## 2023-04-20 PROCEDURE — 77334 RADIATION TREATMENT AID(S): CPT

## 2023-04-20 PROCEDURE — 77295 3-D RADIOTHERAPY PLAN: CPT

## 2023-04-21 ENCOUNTER — TELEPHONE (OUTPATIENT)
Dept: ONCOLOGY | Age: 53
End: 2023-04-21

## 2023-04-23 DIAGNOSIS — C78.7 SECONDARY MALIGNANT NEOPLASM OF LIVER (HCC): Primary | ICD-10-CM

## 2023-04-24 ENCOUNTER — HOSPITAL ENCOUNTER (OUTPATIENT)
Dept: RADIATION THERAPY | Age: 53
Discharge: HOME | End: 2023-04-24
Payer: MEDICAID

## 2023-04-24 PROCEDURE — 77412 RADIATION TX DELIVERY LVL 3: CPT

## 2023-04-24 PROCEDURE — 77417 THER RADIOLOGY PORT IMAGE(S): CPT

## 2023-04-25 NOTE — PROGRESS NOTES
Physician Progress Note      PATIENT:               Fran Sharma  CSN #:                  [id number]  :                       1970  ADMIT DATE:       2023 7:25 PM  [address] DATE:        4/15/2023 1:20 PM  RESPONDING  PROVIDER #:        Nelia Vazquez MD          QUERY TEXT:    Good morning. Pt was admitted from 23-04/15/23 admitted with back pain and was found to have non-small cell lung cancer with metastases to the brain.  MRI of brain showed \"varying degrees of surrounding edema, but no midline shift or herniation\". If clinically significant, please document in progress notes and discharge summary if you are evaluating/treating any of the following: The medical record reflects the following:    Risk Factors: Cigarette smoker, new diagnosis of non-small cell lung cancer with multiple metastases, including brain    Clinical Indicators: Brain MRI: 1. Innumerable (greater than 30) supratentorial and infratentorial metastases, the majority of which are hemorrhagic, with varying degrees of surrounding edema, but no midline shift or herniation. 2. Osseous metastasis in the left occipital bone; from  Hematology/Oncology PN: \"In the meantime, we have initiated dexamethasone for his cerebral edema. \"  ? Treatment: Brain MRI, Hematology/Oncology consult, Dexamethasone      Thank you,  Radha Bah RN, CDI  Options provided:  -- Cerebral edema  -- Other - I will add my own diagnosis  -- Disagree - Not applicable / Not valid  -- Disagree - Clinically unable to determine / Unknown  -- Refer to Clinical Documentation Reviewer    PROVIDER RESPONSE TEXT:    This patient has cerebral edema.     Query created by: Katlyn Dillon on 2023 6:55 AM      Electronically signed by:  Nelia Vazquez MD 2023 3:39 PM

## 2023-04-27 ENCOUNTER — HOSPITAL ENCOUNTER (OUTPATIENT)
Dept: ULTRASOUND IMAGING | Age: 53
Discharge: HOME | End: 2023-04-27
Attending: INTERNAL MEDICINE
Payer: MEDICAID

## 2023-04-27 ENCOUNTER — HOSPITAL ENCOUNTER (OUTPATIENT)
Dept: RADIATION THERAPY | Age: 53
Discharge: HOME | End: 2023-04-27

## 2023-04-27 ENCOUNTER — HOSPITAL ENCOUNTER (OUTPATIENT)
Dept: ULTRASOUND IMAGING | Age: 53
End: 2023-04-27
Attending: INTERNAL MEDICINE
Payer: MEDICAID

## 2023-04-27 VITALS
DIASTOLIC BLOOD PRESSURE: 93 MMHG | HEART RATE: 113 BPM | OXYGEN SATURATION: 94 % | SYSTOLIC BLOOD PRESSURE: 114 MMHG | RESPIRATION RATE: 26 BRPM

## 2023-04-27 DIAGNOSIS — C34.90 NON-SMALL CELL LUNG CANCER METASTATIC TO LIVER (HCC): ICD-10-CM

## 2023-04-27 DIAGNOSIS — C78.7 NON-SMALL CELL LUNG CANCER METASTATIC TO LIVER (HCC): ICD-10-CM

## 2023-04-27 PROCEDURE — 88341 IMHCHEM/IMCYTCHM EA ADD ANTB: CPT

## 2023-04-27 PROCEDURE — 88307 TISSUE EXAM BY PATHOLOGIST: CPT

## 2023-04-27 PROCEDURE — 88342 IMHCHEM/IMCYTCHM 1ST ANTB: CPT

## 2023-04-27 PROCEDURE — 74011250636 HC RX REV CODE- 250/636: Performed by: NURSE PRACTITIONER

## 2023-04-27 PROCEDURE — 77030040395 HC NDL BIOP COAX INTRO MRTM -B

## 2023-04-27 PROCEDURE — 99153 MOD SED SAME PHYS/QHP EA: CPT | Performed by: NURSE PRACTITIONER

## 2023-04-27 PROCEDURE — 49083 ABD PARACENTESIS W/IMAGING: CPT

## 2023-04-27 PROCEDURE — 88112 CYTOPATH CELL ENHANCE TECH: CPT

## 2023-04-27 PROCEDURE — 47000 NEEDLE BIOPSY OF LIVER PERQ: CPT

## 2023-04-27 PROCEDURE — 88305 TISSUE EXAM BY PATHOLOGIST: CPT

## 2023-04-27 PROCEDURE — 88360 TUMOR IMMUNOHISTOCHEM/MANUAL: CPT

## 2023-04-27 PROCEDURE — C1729 CATH, DRAINAGE: HCPCS

## 2023-04-27 PROCEDURE — 88333 PATH CONSLTJ SURG CYTO XM 1: CPT

## 2023-04-27 PROCEDURE — 99152 MOD SED SAME PHYS/QHP 5/>YRS: CPT | Performed by: NURSE PRACTITIONER

## 2023-04-27 PROCEDURE — 77030032034 HC SYS EVAC ASEPT DISP BBMI -B

## 2023-04-27 PROCEDURE — 74011000272 HC RX REV CODE- 272: Performed by: NURSE PRACTITIONER

## 2023-04-27 PROCEDURE — 77030014115

## 2023-04-27 PROCEDURE — 74011000250 HC RX REV CODE- 250: Performed by: NURSE PRACTITIONER

## 2023-04-27 RX ORDER — FENTANYL CITRATE 50 UG/ML
25-100 INJECTION, SOLUTION INTRAMUSCULAR; INTRAVENOUS
Status: DISCONTINUED | OUTPATIENT
Start: 2023-04-27 | End: 2023-04-27

## 2023-04-27 RX ORDER — LIDOCAINE HYDROCHLORIDE 10 MG/ML
4 INJECTION, SOLUTION EPIDURAL; INFILTRATION; INTRACAUDAL; PERINEURAL
Status: COMPLETED | OUTPATIENT
Start: 2023-04-27 | End: 2023-04-27

## 2023-04-27 RX ORDER — LIDOCAINE HYDROCHLORIDE 10 MG/ML
10 INJECTION INFILTRATION; PERINEURAL
Status: COMPLETED | OUTPATIENT
Start: 2023-04-27 | End: 2023-04-27

## 2023-04-27 RX ORDER — MIDAZOLAM HYDROCHLORIDE 1 MG/ML
.5-5 INJECTION, SOLUTION INTRAMUSCULAR; INTRAVENOUS
Status: DISCONTINUED | OUTPATIENT
Start: 2023-04-27 | End: 2023-04-27

## 2023-04-27 RX ADMIN — FENTANYL CITRATE 25 MCG: 50 INJECTION, SOLUTION INTRAMUSCULAR; INTRAVENOUS at 10:12

## 2023-04-27 RX ADMIN — FENTANYL CITRATE 25 MCG: 50 INJECTION, SOLUTION INTRAMUSCULAR; INTRAVENOUS at 09:53

## 2023-04-27 RX ADMIN — MIDAZOLAM 1 MG: 1 INJECTION INTRAMUSCULAR; INTRAVENOUS at 10:44

## 2023-04-27 RX ADMIN — LIDOCAINE HYDROCHLORIDE 10 ML: 10 INJECTION, SOLUTION INFILTRATION; PERINEURAL at 11:10

## 2023-04-27 RX ADMIN — Medication 1 EACH: at 10:47

## 2023-04-27 RX ADMIN — MIDAZOLAM 1 MG: 1 INJECTION INTRAMUSCULAR; INTRAVENOUS at 10:24

## 2023-04-27 RX ADMIN — MIDAZOLAM 1 MG: 1 INJECTION INTRAMUSCULAR; INTRAVENOUS at 10:19

## 2023-04-27 RX ADMIN — FENTANYL CITRATE 25 MCG: 50 INJECTION, SOLUTION INTRAMUSCULAR; INTRAVENOUS at 10:19

## 2023-04-27 RX ADMIN — FENTANYL CITRATE 25 MCG: 50 INJECTION, SOLUTION INTRAMUSCULAR; INTRAVENOUS at 10:25

## 2023-04-27 RX ADMIN — MIDAZOLAM 1 MG: 1 INJECTION INTRAMUSCULAR; INTRAVENOUS at 09:53

## 2023-04-27 RX ADMIN — MIDAZOLAM 1 MG: 1 INJECTION INTRAMUSCULAR; INTRAVENOUS at 10:12

## 2023-04-27 RX ADMIN — LIDOCAINE HYDROCHLORIDE 4 ML: 10 INJECTION, SOLUTION EPIDURAL; INFILTRATION; INTRACAUDAL; PERINEURAL at 11:09

## 2023-04-27 NOTE — DISCHARGE INSTRUCTIONS
Learning About Paracentesis  What is paracentesis? Paracentesis (say \"xapd-im-ldq-CLAUDETTE-belkys\") is a procedure that removes fluid from the belly. The buildup of fluid may be caused by infection, inflammation, an injury, or other problems. Swelling from too much fluid may cause pain or trouble breathing. The doctor will remove the extra fluid with a needle attached to a tube. Why is paracentesis done? Paracentesis may be done to:  Find the cause of fluid buildup in the belly. Diagnose an infection in the peritoneal fluid. Check for certain types of cancer. Remove a large amount of fluid that is causing pain or trouble breathing or that is affecting how the kidneys or the intestines (bowel) are working. How is the procedure done? You will empty your bladder before the procedure. Your doctor or nurse will clean the area of your belly where the needle will go in. Then sterile towels will be put around the area. You may get a shot of numbing medicine in the skin of your belly. Then your doctor will gently insert a needle where the fluid is. Your doctor may attach a tube (catheter) to the site to help collect the fluid. After the fluid has drained, your doctor will take out the needle or catheter and put a bandage on the site. How long does a paracentesis take? The procedure may take from a few minutes to 30 minutes or more. What happens after the test?  You can do your normal activities after the procedure, unless your doctor tells you not to. After the procedure, you may have some clear fluid draining from the site, especially if a large amount of fluid was taken out. There will be less drainage in 1 to 2 days. Ask your doctor how much drainage to expect. A small gauze pad and bandage may be needed. You may need to change the bandage. If your doctor thinks that testing the fluid can help find out the cause of a problem, your doctor will send it to a lab.   If fluid builds up in your belly again, your doctor may repeat this procedure. When should you call for help? Call 911 anytime you think you may need emergency care. For example, call if:  You passed out (lost consciousness). Call your doctor now or seek immediate medical care if:  You are dizzy or lightheaded, or you feel like you may faint. You have new or worse belly pain. You have symptoms of infection from the paracentesis site, such as: Increased pain, swelling, warmth, or redness. Red streaks leading from the area. Pus draining from the area. A fever. Watch closely for changes in your health, and be sure to contact your doctor if:  Fluid builds up in your belly again. You do not get better as expected. Where can you learn more? Go to http://www.gray.com/  Enter X447 in the search box to learn more about \"Learning About Paracentesis. \"  Current as of: June 6, 2022               Content Version: 13.6  © 2006-2023 [employer]. Care instructions adapted under license by [facility] (which disclaims liability or warranty for this information). If you have questions about a medical condition or this instruction, always ask your healthcare professional. [place] [address] any warranty or liability for your use of this information. Percutaneous Liver Biopsy: What to Expect at [address]  A needle biopsy of the liver is a procedure to take a tiny sample (biopsy) of your liver tissue. The tissue sample is looked at under a microscope. Your doctor can look for infection or other liver problems. You may have some pain where the biopsy needle entered your skin (the puncture site). You may also have pain in your shoulder. This is called referred pain. It is caused by pain traveling along a nerve near the biopsy site. The referred pain usually lasts less than 12 hours. You may have a small amount of bleeding from the puncture site.   You can probably go home if you have no problems after the test. You will need to take it easy at home for 1 or 2 days after the procedure. You will probably be able to return to work and most of your usual activities after that. This care sheet gives you a general idea about how long it will take for you to recover. But each person recovers at a different pace. Follow the steps below to get better as quickly as possible. How can you care for yourself at home? Activity    Rest when you feel tired. Getting enough sleep will help you recover. Try to walk each day. Start by walking a little more than you did the day before. Bit by bit, increase the amount you walk. Walking boosts blood flow and helps prevent pneumonia and constipation. Avoid exercises that use your belly muscles and strenuous activities, such as bicycle riding, jogging, weight lifting, or aerobic exercise, for 1 week or until your doctor says it is okay. Ask your doctor when you can drive again. You will probably need to take 1 or 2 days off from work. It depends on the type of work you do and how you feel. You will probably be able to shower the same day as the test, if your doctor says it is okay. Pat the puncture site dry. Do not take a bath for at least 2 days after the test, or until your doctor tells you it is okay. Diet    You can eat your normal diet. If your stomach is upset, try bland, low-fat foods like plain rice, broiled chicken, toast, and yogurt. Drink plenty of fluids (unless your doctor tells you not to). Medicines    Your doctor will tell you if and when you can restart your medicines. He or she will also give you instructions about taking any new medicines. If you stopped taking aspirin or some other blood thinner, your doctor will tell you when to start taking it again. Be safe with medicines. Take pain medicines exactly as directed. If the doctor gave you a prescription medicine for pain, take it as prescribed.   If you are not taking a prescription pain medicine, take an over-the-counter medicine that your doctor recommends. Read and follow all instructions on the label. Do not take aspirin, ibuprofen (Advil, Motrin), naproxen (Aleve), or other nonsteroidal anti-inflammatory drugs (NSAIDs) unless your doctor says it is okay. If you think your pain medicine is making you sick to your stomach: Take your medicine after meals (unless your doctor has told you not to). Ask your doctor for a different kind of pain medicine. Care of the puncture site    Keep a bandage over the puncture site for the first 1 or 2 days. Follow-up care is a key part of your treatment and safety. Be sure to make and go to all appointments, and call your doctor if you are having problems. It's also a good idea to know your test results and keep a list of the medicines you take. When should you call for help? Call 911 anytime you think you may need emergency care. For example, call if:    You passed out (lost consciousness). You have severe trouble breathing. You have sudden chest pain and shortness of breath, or you cough up blood. You have severe pain in your chest, shoulder, or belly. Call your doctor now or seek immediate medical care if:    You have new or worse shortness of breath. Bright red blood has soaked through the bandage over the puncture site. You have pain that does not get better after you take your pain medicine. You are sick to your stomach or cannot keep fluids down. You have a fever, chills, or body aches. You have signs of infection, such as: Increased pain, swelling, warmth, or redness. Red streaks leading from the puncture site. Pus draining from the puncture site. A fever. You have new or worse pain at the puncture site. You have new or worse belly swelling or bloating. You have trouble passing urine or stool. Your stools are black and tarlike or have streaks of blood.      You have pale-colored stools along with dark urine and itching. Watch closely for changes in your health, and be sure to contact your doctor if you have any problems. Where can you learn more? Go to http://www.gray.com/  Enter H806 in the search box to learn more about \"Percutaneous Liver Biopsy: What to Expect at Home. \"  Current as of: November 30, 2022               Content Version: 13.6  © 2006-2023 [employer]. Care instructions adapted under license by [employer] (which disclaims liability or warranty for this information). If you have questions about a medical condition or this instruction, always ask your healthcare professional. [place] [address] any warranty or liability for your use of this information. Learning About Sedation (Including MAC)  What is sedation? Sedation is the use of medicine to help you feel relaxed and comfortable during a procedure. Sometimes it's used to help with pain. Sedation may be used with an injection to numb the area or with other medicine to reduce pain. It's often used in procedures like a colonoscopy or a biopsy. It also can be used in many surgeries. Examples include knee surgery and hernia repair. You may be awake and able to talk with your care team. Or you may fall asleep. You might remember little, if anything, of the procedure or surgery. How is it done? Sedation is usually given in a vein in the arm (intravenously, or IV). It is often used with local or regional anesthesia. The local type numbs a small part of the body. The regional type blocks pain to a larger area of the body. While you are sedated, a doctor or nurse will watch you closely. They'll make sure you stay safe and comfortable. In some cases, an anesthesia professional may be there during the procedure to help keep you safe. This is often called monitored anesthesia care (MAC). How do you prepare?   Your doctor will tell you what to expect when you have sedation. You'll get instructions to help you prepare. They'll include when to stop eating or drinking. If you take medicine, you'll be told what you can and can't take before sedation. You'll need to bring someone who can take you home. What are the risks? Serious problems are rare. They include breathing that slows or stops and an allergic reaction to the medicine. Some health issues may increase the risk of problems. These include:  Smoking. Sleep apnea. This happens during sleep when a blocked airway causes breathing problems. Being overweight. Where can you learn more? Go to http://www.gray.com/  Enter F569 in the search box to learn more about \"Learning About Sedation (Including MAC). \"  Current as of: October 20, 2022               Content Version: 13.6  © 0252-4002 [employer]. Care instructions adapted under license by [employer] (which disclaims liability or warranty for this information). If you have questions about a medical condition or this instruction, always ask your healthcare professional. [place] [address] any warranty or liability for your use of this information.

## 2023-04-27 NOTE — PROGRESS NOTES
2529- Pt brought back to radiology holding via hospital wheelchair. Pt has stated he has been using a cane. He stated his back pain was \"6\" on 0-10 scale. He described his pain as an aching pain. He stated they gave me morphine for pain. Pt placed in gown and base line VS taken. ASA 2, Airway 2 poor dental hygiene, S 1 and S 2 Sinus tachycardia, lungs clear, abdomen tender, pt stated \"I feel like I have fluid in my belly\". 5481- PIV #20 LFT AC placed by Lu HARTMAN with positive blood return and flushed with saline. C/Juarez Rothman NP obtained informed consent for US liver biopsy and possible US paracentesis. He gave pt opportunity to ask questions and provided clarification. Discussed sedation plan of care with [facility] NP.   2535- Pt taken to Ultrasound placed on Elina monitor. 3573- Time out performed and sedation given. [facility] placed a one step pigtail cathter RT lower abdomen, cathter was hooked to suction canister. Dark ted fluid sent to lab. Total amount of fluid removed was 3,300 ml. Cathter was removed and gauze and Tegaderm placed over site. [facility] NP obtained liver biopsy and samples where given to pathology. Gauze and Tegaderm placed over RT site. 1055- Pt tolerated para and US liver biopsy well. Total amount of sedation given was Versed 5 mg and Fentanyl 100 mcg. 1105- Pt taken back to radiology holding for recovery. 200- Spoke with pt wife Suzanne Sharma and gave her a update. Pt to have radiation at [facility] side today at 1350. Discharge time of 1245.   1145- Pt resting with no C/O pain. 1230- Pt sitting eating a turkey sandwich and chips with no C/O pain at this time. RT upper abdomen dressing dry and intact. 1245- Discharge instructions given to pt. Pt was given opportunity to ask questions and clarification was provided. 1252. PIV RT AC was removed and gauze and coban placed over site. 1255- Pt taken to the front [place] via wheel chair to the care of his wife Suzanne Sharma.

## 2023-04-27 NOTE — H&P
INTERVENTIONAL RADIOLOGY  Preoperative History and Physical      Patient:  Kandy Vizcaino  :  1970  Age:  50 y.o. MRN:  [id number]  Today's Date:  2023      CC / HPI   Kandy Vizcaino is a 50 y.o. male with a history of metastatic non-small cell lung cancer who presents for liver biopsy. PAST MEDICAL HISTORY  Past Medical History:   Diagnosis Date    Anxiety     Chest pain     Stress test normal    Hypertension      PAST SURGICAL HISTORY  Past Surgical History:   Procedure Laterality Date    HX HERNIA REPAIR       SOCIAL HISTORY  Social History     Socioeconomic History    Marital status:      Spouse name: Not on file    Number of children: Not on file    Years of education: Not on file    Highest education level: Not on file   Occupational History    Not on file   Tobacco Use    Smoking status: Every Day     Packs/day: 0.50     Types: Cigarettes    Smokeless tobacco: Never   Vaping Use    Vaping Use: Never used   Substance and Sexual Activity    Alcohol use: No    Drug use: Yes     Types: Marijuana    Sexual activity: Not on file   Other Topics Concern    Not on file   Social History Narrative    Not on file     Social Determinants of Health     Financial Resource Strain: Not on file   Food Insecurity: Not on file   Transportation Needs: Not on file   Physical Activity: Not on file   Stress: Not on file   Social Connections: Not on file   Intimate Partner Violence: Not on file   Housing Stability: Not on file     FAMILY HISTORY  Family History   Problem Relation Age of Onset    Asthma Mother     Heart Attack Father     Heart Disease Maternal Uncle      CURRENT MEDICATIONS  Current Outpatient Medications   Medication Sig Dispense Refill    amLODIPine (NORVASC) 5 mg tablet Take 1 Tablet by mouth daily for 30 days. 30 Tablet 0    naloxone (Narcan) 4 mg/actuation nasal spray Use 1 spray intranasally, then discard.  Repeat with new spray every 2 min as needed for opioid overdose symptoms, alternating nostrils. 1 Each 0    oxyCODONE IR (OXY-IR) 15 mg immediate release tablet Take 1 Tablet by mouth every four (4) hours as needed for Pain for up to 14 days. Max Daily Amount: 90 mg. 84 Tablet 0    morphine ER (MS CONTIN) 15 mg CR tablet Take 1 Tablet by mouth every twelve (12) hours for 30 days. Max Daily Amount: 30 mg. 60 Tablet 0     Current Facility-Administered Medications   Medication Dose Route Frequency Provider Last Rate Last Admin    midazolam (VERSED) injection 0.5-5 mg  0.5-5 mg IntraVENous Rad Multiple Harlo Cocks., NP        fentaNYL citrate (PF) injection  mcg   mcg IntraVENous Rad Multiple Harlo Cocks., NP         ALLERGIES  No Known Allergies      DIAGNOSTIC STUDIES   IMAGING STUDIES  I personally reviewed the following imaging studies and reports:    CT Results:  Results from Hospital Encounter encounter on 04/11/23    CT ABDOMEN W WO CONT AND PELVIS W CONT    Narrative  EXAM: CT ABDOMEN W WO CONT AND PELVIS W CONT    INDICATION: Metastatic evaluation w/ MALI mass, pulm nodules widespread spinal  mets - Need Abd eval    COMPARISON: CT chest 4/11/2023, CT Lumbar Spine 4/11/2023. IV CONTRAST: 100 mL of Isovue-370. ORAL CONTRAST: None    TECHNIQUE:  Multislice helical CT was performed from the diaphragm to the iliac crest prior  to intravenous contrast administration and from the diaphragm to the symphysis  pubis during uneventful rapid bolus intravenous contrast administration. Contiguous 5 mm axial images were reconstructed and lung and soft tissue windows  were generated. Coronal and sagittal reformations were generated. CT dose  reduction was achieved through use of a standardized protocol tailored for this  examination and automatic exposure control for dose modulation. FINDINGS:  LOWER THORAX: Cardiomegaly. Small pericardial effusion. Patchy lingular  consolidation. Innumerable pulmonary nodules in the lung bases.   LIVER: Innumerable hypodense masses, largest of which measures 2.9 cm in segment  7 (7-31). BILIARY TREE: Gallbladder is within normal limits. CBD is not dilated. SPLEEN: within normal limits. PANCREAS: No mass or ductal dilatation. ADRENALS: 10 mm left adrenal nodule measuring less than 10 Hounsfield units in  attenuation, compatible with a benign lipid rich adenoma. KIDNEYS: No mass, calculus, or hydronephrosis. Right lower pole cysts and  several subcentimeter hypodensities bilaterally which are too small to  accurately characterize; no dedicated follow-up recommended. STOMACH: Unremarkable. SMALL BOWEL: No dilatation or wall thickening. COLON: No dilatation or wall thickening. APPENDIX: Normal.  PERITONEUM: Small volume ascites. Extensive peritoneal carcinomatosis. No  pneumoperitoneum. RETROPERITONEUM: No lymphadenopathy or aortic aneurysm. REPRODUCTIVE ORGANS: Prostatomegaly. URINARY BLADDER: No mass or calculus. BONES: Widespread lytic osseous metastases (e.g., T10 vertebral body, T11  vertebral body (with associated chronic appearing pathologic compression  deformity), L2 vertebral body, L3 right pedicle, right pubic bone,  ABDOMINAL WALL: No mass or hernia. ADDITIONAL COMMENTS: N/A    Impression  1. Innumerable hepatic metastases. 2.  Extensive peritoneal carcinomatosis. 3.  Scattered lytic osseous metastases, with chronic appearing T11 pathologic  compression deformity. 4.  Small volume ascites, likely malignant. 5.  Left adrenal adenoma. 6.  Redemonstrated innumerable pulmonary nodules in the lung bases.       LABS  Lab Results   Component Value Date/Time    WBC 17.3 (H) 04/15/2023 05:04 AM    HGB 11.1 (L) 04/15/2023 05:04 AM    HCT 33.4 (L) 04/15/2023 05:04 AM    PLATELET 490 (H) 44/01/3870 05:04 AM    MCV 93.0 04/15/2023 05:04 AM     Lab Results   Component Value Date/Time    Sodium 137 04/15/2023 05:04 AM    Potassium 4.0 04/15/2023 05:04 AM    Chloride 105 04/15/2023 05:04 AM    CO2 30 04/15/2023 05:04 AM Anion gap 2 (L) 04/15/2023 05:04 AM    Glucose 141 (H) 04/15/2023 05:04 AM    BUN 21 (H) 04/15/2023 05:04 AM    Creatinine 0.81 04/15/2023 05:04 AM    BUN/Creatinine ratio 26 (H) 04/15/2023 05:04 AM    GFR est AA >60 06/04/2017 02:46 PM    GFR est non-AA >60 06/04/2017 02:46 PM    Calcium 9.0 04/15/2023 05:04 AM     No results found for: INR, PTMR, PTP, PT1, PT2, INREXT      PHYSICAL EXAM   There were no vitals taken for this visit.   General:  NAD  Heart:  RRR  Lungs:  NWOB  Neurological:  AAOX3      ASSESSMENT / PLAN   Procedure to be performed:  US guided liver lesion biopsy  Plan for sedation:  moderate  Post procedure plan:  observation per protocol  Informed consent:  risks, benefits, and alternatives reviewed with the patient / family who agree to proceed  Code status:  full code      Josemanuel Rivera., MICHAEL  [facility].

## 2023-04-28 ENCOUNTER — TELEPHONE (OUTPATIENT)
Dept: ONCOLOGY | Age: 53
End: 2023-04-28

## 2023-04-28 NOTE — TELEPHONE ENCOUNTER
[employer]  Oncology Social Work Encounter     Patient Name:  Maribel Jalloh, [address] History: metastatic non-small cell lung cancer     Advance Directives:  none on file; conversation deferred     Narrative: Call placed to patient regarding missed Med Onc appointment today. Spoke with patient and his wife Alexys Lopes ([phone]). Patient and wife voiced they were unaware of the appointment. Alexys Lopes is also managing her own serious medical issues which has made coordinating appointments difficult for them. She asked to be contacted with appointment information moving forward to better keep track of them. -Advised patient's appointment with Dr. Elizabeth Moya rescheduled for 5/1 at 4pm. Address provided. -Advised patient has daily radiation appointments 5/1 - 5/5 at 9:30am. Patient is unable to make is Tuesday and Friday appointment. Referral placed to radiation to follow-up with Alexys Lopes to adjust treatment time. She voiced understanding and appreciation. Barriers to Care: limited support    Plan:   -Advised patient's appointment with Dr. Elizabeth Moya rescheduled for 5/1 at 4pm. Address provided. -Advised patient has daily radiation appointments 5/1 - 5/5 at 9:30am. Patient is unable to make is Tuesday and Friday appointment. Referral placed to radiation to follow-up with Alexys Lopes to adjust treatment time.   -Coordination with Rad Onc  ([address]) for continuity of care    Referral/Handouts:      Thank you,   Rachael Vallejo [facility]

## 2023-05-01 ENCOUNTER — HOSPITAL ENCOUNTER (OUTPATIENT)
Dept: INFUSION THERAPY | Age: 53
Discharge: HOME | End: 2023-05-01
Payer: MEDICAID

## 2023-05-01 ENCOUNTER — OFFICE VISIT (OUTPATIENT)
Dept: ONCOLOGY | Age: 53
End: 2023-05-01
Payer: MEDICAID

## 2023-05-01 ENCOUNTER — TELEPHONE (OUTPATIENT)
Dept: ONCOLOGY | Age: 53
End: 2023-05-01

## 2023-05-01 ENCOUNTER — HOSPITAL ENCOUNTER (OUTPATIENT)
Dept: RADIATION THERAPY | Age: 53
Discharge: HOME | End: 2023-05-01
Payer: MEDICAID

## 2023-05-01 VITALS
HEART RATE: 126 BPM | WEIGHT: 185 LBS | OXYGEN SATURATION: 91 % | BODY MASS INDEX: 29.73 KG/M2 | HEIGHT: 66 IN | RESPIRATION RATE: 22 BRPM | DIASTOLIC BLOOD PRESSURE: 75 MMHG | TEMPERATURE: 97.3 F | SYSTOLIC BLOOD PRESSURE: 115 MMHG

## 2023-05-01 DIAGNOSIS — R18.0 MALIGNANT ASCITES: ICD-10-CM

## 2023-05-01 DIAGNOSIS — C79.31 LUNG CANCER METASTATIC TO BRAIN (HCC): ICD-10-CM

## 2023-05-01 DIAGNOSIS — C34.90 NON-SMALL CELL LUNG CANCER METASTATIC TO LIVER (HCC): Primary | ICD-10-CM

## 2023-05-01 DIAGNOSIS — G89.3 CANCER RELATED PAIN: ICD-10-CM

## 2023-05-01 DIAGNOSIS — R16.0 LIVER MASSES: Primary | ICD-10-CM

## 2023-05-01 DIAGNOSIS — C34.90 LUNG CANCER METASTATIC TO BRAIN (HCC): ICD-10-CM

## 2023-05-01 DIAGNOSIS — C79.51 METASTATIC ADENOCARCINOMA TO BONE (HCC): ICD-10-CM

## 2023-05-01 DIAGNOSIS — C78.7 NON-SMALL CELL LUNG CANCER METASTATIC TO LIVER (HCC): Primary | ICD-10-CM

## 2023-05-01 PROCEDURE — 86706 HEP B SURFACE ANTIBODY: CPT

## 2023-05-01 PROCEDURE — 87340 HEPATITIS B SURFACE AG IA: CPT

## 2023-05-01 PROCEDURE — 77412 RADIATION TX DELIVERY LVL 3: CPT

## 2023-05-01 PROCEDURE — 99214 OFFICE O/P EST MOD 30 MIN: CPT | Performed by: INTERNAL MEDICINE

## 2023-05-01 PROCEDURE — 3078F DIAST BP <80 MM HG: CPT | Performed by: INTERNAL MEDICINE

## 2023-05-01 PROCEDURE — 77417 THER RADIOLOGY PORT IMAGE(S): CPT

## 2023-05-01 PROCEDURE — 3074F SYST BP LT 130 MM HG: CPT | Performed by: INTERNAL MEDICINE

## 2023-05-01 PROCEDURE — 86704 HEP B CORE ANTIBODY TOTAL: CPT

## 2023-05-01 PROCEDURE — 86803 HEPATITIS C AB TEST: CPT

## 2023-05-01 PROCEDURE — 36415 COLL VENOUS BLD VENIPUNCTURE: CPT

## 2023-05-01 RX ORDER — SODIUM CHLORIDE 0.9 % (FLUSH) 0.9 %
5-40 SYRINGE (ML) INJECTION AS NEEDED
Status: CANCELLED | OUTPATIENT
Start: 2023-05-01

## 2023-05-01 RX ORDER — SODIUM CHLORIDE 9 MG/ML
5-40 INJECTION INTRAVENOUS AS NEEDED
Status: DISCONTINUED | OUTPATIENT
Start: 2023-05-01 | End: 2023-05-02 | Stop reason: HOSPADM

## 2023-05-01 RX ORDER — SODIUM CHLORIDE 9 MG/ML
5-250 INJECTION, SOLUTION INTRAVENOUS AS NEEDED
Status: DISCONTINUED | OUTPATIENT
Start: 2023-05-01 | End: 2023-05-02 | Stop reason: HOSPADM

## 2023-05-01 RX ORDER — SODIUM CHLORIDE 9 MG/ML
5-250 INJECTION, SOLUTION INTRAVENOUS AS NEEDED
Status: CANCELLED | OUTPATIENT
Start: 2023-05-01

## 2023-05-01 RX ORDER — SODIUM CHLORIDE 0.9 % (FLUSH) 0.9 %
5-40 SYRINGE (ML) INJECTION AS NEEDED
Status: DISCONTINUED | OUTPATIENT
Start: 2023-05-01 | End: 2023-05-02 | Stop reason: HOSPADM

## 2023-05-01 RX ORDER — HEPARIN 100 UNIT/ML
500 SYRINGE INTRAVENOUS AS NEEDED
Status: CANCELLED
Start: 2023-05-01

## 2023-05-01 RX ORDER — SODIUM CHLORIDE 9 MG/ML
5-40 INJECTION INTRAVENOUS AS NEEDED
Status: CANCELLED | OUTPATIENT
Start: 2023-05-01

## 2023-05-01 RX ORDER — HEPARIN 100 UNIT/ML
500 SYRINGE INTRAVENOUS AS NEEDED
Status: DISCONTINUED | OUTPATIENT
Start: 2023-05-01 | End: 2023-05-02 | Stop reason: HOSPADM

## 2023-05-01 NOTE — PROGRESS NOTES
[facility] [place] [facility] [place] [address] [facility] [address] [facility]  Enrike Debby: [phone]  F: [phone] Patient ID  Name: Horacio Pratt  YOB: 1970  MRN: [id number]  Referring Provider:   No referring provider defined for this encounter. Primary Care Provider:   Ciera Jackman MD       HEMATOLOGY/MEDICAL ONCOLOGY  NOTE     Reason for Evaluation:     Chief Complaint   Patient presents with    New Patient     Metastatic Lung Cancer  Oncology History:   Please review original records for clinical decision making. This summary highlights focused aspects of patient's ongoing care and may have a recurring section in notes with either updates or remain unchanged as a longitudinal care summary. ______________________________________________________  DIAGNOSIS: Non-Small Cell Lung Adenocarcinoma  <>PATHOLOGY<>  Specimen #:[id number] Collect Date: 4/27/2023 Peritoneal Fluid, ThinPrep and cell block:          Adenocarcinoma. Specimen #:[id number] Collect Date: 4/27/2023   Ki-67 proliferation index: 40% The immunophenotype is compatible with metastatic pulmonary adenocarcinoma in the proper clinical setting. Specimen #:[id number] Collect Date: 4/12/2023 Lung, left, core biopsy and touch prep:   Adenocarcinoma, favor lung primary. See comment     <>STAGING<>   Cancer Staging   Non-small cell lung cancer metastatic to liver [facility])  Staging form: Lung, AJCC 8th Edition  - Clinical stage from 4/12/2023: Stage IVB (cT4, cNX, pM1c)    <>GOALS OF CARE<> PALLIATIVE  <>CURRENT TREATMENT<> Palliative Radiation to Brain and Spine  <>PRIOR TREATMENT>US PARACENTESIS ABD W IMAGING4/27/2023  evacuation of 3300 ml of ascitic fluid. CT CHEST WO CONT 4/11/2023 FINDINGS: CHEST WALL: No axillary supra clavicular adenopathy THYROID: No nodule. MEDIASTINUM: No mass or lymphadenopathy. DENISE: No mass or lymphadenopathy. THORACIC AORTA: No aneurysm. MAIN PULMONARY ARTERY: Normal in caliber. TRACHEA/BRONCHI: Patent. ESOPHAGUS: No wall thickening or dilatation. HEART: Normal in size. Coronary artery calcium: absent PLEURA: No effusion or pneumothorax. LUNGS: Focal masslike opacity in the left upper lobe medially some which may represent postobstructive changes. There are diffuse pulmonary nodules throughout the lungs. INCIDENTALLY IMAGED UPPER ABDOMEN: Multiple liver lesions are present suspicious for metastatic disease. Perihepatic and perisplenic ascites is noted BONES: Multiple lytic lesions in the thoracic spine. 1.  Diffuse pulmonary nodules consistent with metastatic disease. 2.  Focal masslike opacity in the left upper lobe medially suspicious for malignancy. Evaluation is limited without IV contrast. Similar finding may be related to postobstructive changes. 3.  Lytic lesions in the thoracic spine consistent with metastatic disease. 4.  Probable metastases. . Small volume ascites    CT SPINE LUMB WO CONT 4/11/2023 FINDINGS: There is normal mineralization. There are numerous interosseous lucencies demonstrated appearing largest in the inferior L2 vertebral body, and the right pedicle of L3, measuring up to 16 mm in size. The findings are highly suspicious for osseous metastatic disease or other multifocal malignancy such as multiple myeloma. Vertebral body heights are normal. Alignment is anatomic. No paraspinal mass is demonstrated. T12-L1: Normal disc height. Mild diffuse disc bulging. No facet arthropathy or canal-foraminal stenosis. L1-L2: Normal disc height, no disc herniation or substantial disc bulging. No facet arthropathy or canal-foraminal stenosis. L2-L3: Mild disc space narrowing and leftward lateralizing diffuse disc bulging with left foraminal and far lateral accentuation. No substantial facet arthrosis. No canal stenosis. Mild-moderate left and mild right foraminal stenosis. L3-L4: Normal disc height. No disc herniation or substantial disc bulging. No facet arthropathy.  No canal or foraminal stenosis. L4-L5: Mild disc space narrowing. Mild-moderate leftward lateralizing diffuse disc bulging, with accentuation in the left foraminal region. No substantial facet arthrosis. No canal stenosis. Moderate left and mild-moderate right foraminal stenosis. L5-S1: Normal disc height. No disc herniation or bulging. No facet arthropathy. No canal or foraminal stenosis. SI joints: Mild-moderate left and mild right SI joint osteoarthrosis. 1. Multifocal lytic lesions of bone appearing largest in the L2 vertebral body in the right L3 pedicle. Findings are highly concerning for osseous metastatic disease or multiple myeloma. 2. Degenerative findings as above. CT ABDOMEN W WO CONT AND PELVIS W CONT 4/12/2023 FINDINGS: LOWER THORAX: Cardiomegaly. Small pericardial effusion. Patchy lingular consolidation. Innumerable pulmonary nodules in the lung bases. LIVER: Innumerable hypodense masses, largest of which measures 2.9 cm in segment 7 (7-31). BILIARY TREE: Gallbladder is within normal limits. CBD is not dilated. SPLEEN: within normal limits. PANCREAS: No mass or ductal dilatation. ADRENALS: 10 mm left adrenal nodule measuring less than 10 Hounsfield units in attenuation, compatible with a benign lipid rich adenoma. KIDNEYS: No mass, calculus, or hydronephrosis. Right lower pole cysts and several subcentimeter hypodensities bilaterally which are too small to accurately characterize; no dedicated follow-up recommended. STOMACH: Unremarkable. SMALL BOWEL: No dilatation or wall thickening. COLON: No dilatation or wall thickening. APPENDIX: Normal. PERITONEUM: Small volume ascites. Extensive peritoneal carcinomatosis. No pneumoperitoneum. RETROPERITONEUM: No lymphadenopathy or aortic aneurysm. REPRODUCTIVE ORGANS: Prostatomegaly. URINARY BLADDER: No mass or calculus.  BONES: Widespread lytic osseous metastases (e.g., T10 vertebral body, T11 vertebral body (with associated chronic appearing pathologic compression deformity), L2 vertebral body, L3 right pedicle, right pubic bone, ABDOMINAL WALL: No mass or hernia. ADDITIONAL COMMENTS: N/A   1. Innumerable hepatic metastases. 2.  Extensive peritoneal carcinomatosis. 3.  Scattered lytic osseous metastases, with chronic appearing T11 pathologic compression deformity. 4.  Small volume ascites, likely malignant. 5.  Left adrenal adenoma. 6.  Redemonstrated innumerable pulmonary nodules in the lung bases. CT BX LUNG NDL PERC W IMAGING 4/12/2023 CT guided left upper lobe lung mass biopsy performed. Please see dedicated pathology report for full details. MRI CERV SPINE W WO CONT 4/13/2023 FINDINGS: There is normal alignment of the cervical spine. Vertebral body heights are maintained. Abnormal signal and enhancement is seen throughout the C4 vertebral body. Similar-appearing lesions are seen in the inferior endplate of T1 and approximately half of T3. Lesions are also seen in the posterior spinous processes of C6 and C7. There are several enhancing lesions in the cerebellum. The largest is a ring lesion measuring 15 mm. The paraspinal soft tissues are within normal limits. C2-C3: No herniation or stenosis. C3-C4: No herniation or stenosis. C4-C5: No herniation or stenosis. C5-C6: No herniation or stenosis. C6-C7: No herniation or stenosis. C7-T1: No herniation or stenosis. 1. Several osseous lesions in the cervical spine as above likely representing metastases. No evidence of pathologic fracture. 2. Several enhancing lesions in the cerebellum likely representing brain metastases. MRI of the brain with contrast may be obtained for further evaluation. MRI [facility] SPINE WO CONT 4/13/2023 1. Multiple metastases in the thoracic spine. 2. Mild pathologic compression deformity of K93 of uncertain age. 3. Mass in the medial left upper lobe with multiple pulmonary metastases again seen. MRI LUMB SPINE WO CONT FINDINGS: There is normal alignment of the lumbar spine.  Vertebral body heights are maintained. Multiple metastases are seen scattered throughout the visualized spine. The conus medullaris terminates at T12. Signal and caliber of the distal spinal cord are within normal limits. There is a cyst in the posterior right kidney. Lower thoracic spine: No herniation or stenosis. L1-L2: No herniation or stenosis. L2-L3: Disc desiccation. No herniation or stenosis. L3-L4: No herniation or stenosis. L4-L5: Disc desiccation. No significant spinal stenosis. There is mild bilateral neural foraminal narrowing. L5-S1: No herniation or stenosis. 1. Multiple metastases throughout the visualized spine. No pathologic compression fracture. 2. Mild bilateral neural foraminal narrowing at L4-5. NM BONE SCAN [facility] BODY 4/14/2023EXAM: Whole Body Nuclear Bone Scan is performed with IV injection of 22.5 mCi technetium 99m. INDICATION: bone mets, staging Comparison Bone Scan: None. Correlation Imaging Studies: CT CAP. TECHNIQUE: Anterior and posterior whole body scintigraphic planar images are obtained. FINDINGS: There are multiple foci of abnormal skeletal radiotracer uptake, compatible with metastases. These are present in the calvarium, spine, shoulders, sternum, ribs and pelvis. Numerous bone metastases. MRI BRAIN W WO CONT 4/14/2023 FINDINGS: There are innumerable (greater than 30) supratentorial and infratentorial enhancing lesions consistent with metastatic disease. Greatest edema surrounds a lesion in the left splenium of the corpus callosum which measures 9 mm (series 14 image 15). The largest metastases are in the right cerebellum, measuring 2.2 cm (series 14 image 8) and 1.2 cm (series 14 image 6), with mild surrounding edema. A metastasis in the left temporal lobe measuring 8 mm lies 5 mm from the left hippocampus (series 15 image 99). There is overall no midline shift or herniation.  The majority of the metastases are hemorrhagic, as demonstrated by T1 hyperintensity and susceptibility hypointensity. There is an osseous metastasis in the left occipital bone measuring 1.9 x 1.0 cm (series 9 image 10). The ventricles are normal in size and position. There is no acute infarct. There is no cerebellar tonsillar herniation. Expected arterial flow-voids are present. The paranasal sinuses are clear. Moderate right mastoid effusion. The orbital contents are within normal limits. 1. Innumerable (greater than 30) supratentorial and infratentorial metastases, the majority of which are hemorrhagic, with varying degrees of surrounding edema, but no midline shift or herniation. 2. Osseous metastasis in the left occipital bone. CT CHEST W CONT 4/14/2023 FINDINGS: CHEST WALL: No mass or axillary lymphadenopathy. THYROID: No nodule. MEDIASTINUM: Several prominent mediastinal nodes including 10 mm pericardiophrenic node, 2-42. DENISE: 13 mm left hilar node (2-23). Other reactive appearing nonenlarged hilar nodes. THORACIC AORTA: No dissection or aneurysm. MAIN PULMONARY ARTERY: Normal in caliber. TRACHEA/BRONCHI: Patent. ESOPHAGUS: No wall thickening or dilatation. HEART: Normal in size. Coronary artery calcium: absent PLEURA: Trace left pleural effusion. No pneumothorax. LUNGS: Partial collapse of the left upper lobe. There is a left apical perihilar/paramediastinal mass measuring 7.3 x 3.4 cm TV and 5.7 cm CC, 2-19. Innumerable bilateral pulmonary nodules are identified measuring up to 1 cm. Representative examples include: 10 mm lingular nodule, 2-26; 9 mm right middle lobe nodule, 2-32; and 9 mm superior segment left lower lobe nodule, 2-21. INCIDENTALLY IMAGED UPPER ABDOMEN: Partially imaged carcinomatosis, ascites, and multiple hepatic hypodensities. BONES: Multiple osseous predominantly lytic lesions are identified with representative examples including 11 mm T2 lytic focus, 13 mm T10 lytic focus, 21 mm T11 lytic focus with left pedicular extension; and additional 14 mm T11 lytic focus   1.   Left apical hilar mass measuring 7.3 x 3.4 x 5.7 cm may represent primary versus secondary neoplastic process. 2.  Innumerable bilateral pulmonary nodules most consistent with metastatic disease. 3.  Indeterminate mediastinal and hilar nodes for which metastatic disease not excluded, and can be further evaluated with PET/CT. 4.  Osseous lytic lesions of the thoracic spine at T2, T10, and T11 most consistent with metastatic disease. 5.  Partially imaged carcinomatosis, ascites, and hepatic lesions concerning for metastatic disease. US GUIDE BX TIKA PERC 4/27/2023 (1) Technically successful ultrasound guided paracentesis with evacuation of 3300 ml of ascitic fluid. Cytology results are pending. (1) Technically successful ultrasound guided right lobe liver lesion biopsy. Pathology results are pending. US PARACENTESIS ABD W IMAGING4/27/2023 (1) Technically successful ultrasound guided paracentesis with evacuation of 3300 ml of ascitic fluid. Cytology results are pending. (1) Technically successful ultrasound guided right lobe liver lesion biopsy. Pathology results are pending. Subjective:     History of Present Illness:   Date of Visit: 05/01/23   Scarlet Esparza is a 50 y.o. M who presents for a follow-up evaluation for stage IV metastatic non-small cell lung carcinoma. He has received 3 radiation treatment and is anticipating radiation to not only the brain but his spinal column. Is currently taking hydrocodone,morphine, and steroids. He notes that the pain medications have helped his pain some. He notes that the hydrocodone seems to help him the most. He had some vomiting after radiation today. He is taking Ensure Clear. He reportedly vomited afterwards. Patient reports dealing with pain mainly in his upper and lower back. He has difficulty laying flat. Patient reports decreased oral intake but still eating. Patient denies any bowel or bladder problems. Patient denies any uncontrolled pain.  Patient denies any shortness of breath. Past Medical History:   Diagnosis Date    Anxiety     Chest pain     Stress test normal    Hypertension       Past Surgical History:   Procedure Laterality Date    HX HERNIA REPAIR      HX OTHER SURGICAL      liver biopsy      Social History     Tobacco Use    Smoking status: Former     Packs/day: 0.50     Types: Cigarettes    Smokeless tobacco: Never   Substance Use Topics    Alcohol use: No      Family History   Problem Relation Age of Onset    Asthma Mother     Heart Attack Father     Heart Disease Maternal Uncle      Current Outpatient Medications   Medication Sig    amLODIPine (NORVASC) 5 mg tablet Take 1 Tablet by mouth daily for 30 days. naloxone (Narcan) 4 mg/actuation nasal spray Use 1 spray intranasally, then discard. Repeat with new spray every 2 min as needed for opioid overdose symptoms, alternating nostrils. (Patient not taking: Reported on 5/1/2023)    morphine ER (MS CONTIN) 15 mg CR tablet Take 1 Tablet by mouth every twelve (12) hours for 30 days. Max Daily Amount: 30 mg. (Patient not taking: Reported on 5/1/2023)     No current facility-administered medications for this visit. No Known Allergies   -  Review of Systems Provided by:  Patient  Review of Systems: A complete review of systems was obtained, reviewed. Pertinent findings reviewed above. Objective:   Visit Vitals  /75 (BP 1 Location: Right upper arm, BP Patient Position: Sitting, BP Cuff Size: Large adult)   Pulse (!) 126   Temp 97.3 °F (36.3 °C) (Temporal)   Resp 22   Ht 5' 6\" (1.676 m)   Wt 185 lb (83.9 kg)   SpO2 91%   BMI 29.86 kg/m²     ECOG PS: 2- Ambulatory and capable of all selfcare but unable to carry out any work activities; up and about more than 50% of waking hours. Physical Exam  Constitutional: No acute distress. and Non-toxic appearance. HENT: Normocephalic and atraumatic head. Eyes: Normal Conjunctivae. Anicteric sclerae. Cardiovascular: S1,S2 auscultated.  No pitting edema.  Pulmonary: Normal Respiratory Effort. No wheezing. No rhonchi. No rales. Abdominal: Normal bowel sounds. Soft Abdomen to palpation. No abdominal tenderness. Musculoskeletal: No muscle pain on palpation. No temporal muscle wasting on inspection. Skin: No jaundice. No rash. Neurological: Alert and oriented. No tremor on inspection. Abnormal Gait. Walking with a cane. Psychiatric: mood normal. normal speech rate. normal affect. Results:     I personally reviewed Epic EHR labs/results below:   Lab Results   Component Value Date/Time    WBC 17.3 (H) 04/15/2023 05:04 AM    HGB 11.1 (L) 04/15/2023 05:04 AM    HCT 33.4 (L) 04/15/2023 05:04 AM    PLATELET 622 (H) 52/61/8967 05:04 AM    MCV 93.0 04/15/2023 05:04 AM    ABS. NEUTROPHILS 15.6 (H) 04/15/2023 05:04 AM     Lab Results   Component Value Date/Time    Sodium 137 04/15/2023 05:04 AM    Potassium 4.0 04/15/2023 05:04 AM    Chloride 105 04/15/2023 05:04 AM    CO2 30 04/15/2023 05:04 AM    Glucose 141 (H) 04/15/2023 05:04 AM    BUN 21 (H) 04/15/2023 05:04 AM    Creatinine 0.81 04/15/2023 05:04 AM    GFR est AA >60 06/04/2017 02:46 PM    GFR est non-AA >60 06/04/2017 02:46 PM    Calcium 9.0 04/15/2023 05:04 AM     Lab Results   Component Value Date/Time    Bilirubin, total 0.6 04/11/2023 10:45 PM    ALT (SGPT) 19 04/11/2023 10:45 PM    Alk. phosphatase 171 (H) 04/11/2023 10:45 PM    Protein, total 6.3 04/12/2023 05:41 AM    Albumin 2.8 (L) 04/11/2023 10:45 PM    Globulin 3.8 04/11/2023 10:45 PM     Assessment and Recommendations:     1. Non-small cell lung cancer metastatic to liver [facility])  -Discussed with patient and wife that goals of care remain palliative in intent. We discussed that we are wanting him to undergo liquid biopsy testing to determine if he has a  mutation. If he has a  mutation then we would evaluate if there is a struggle and employ as first-line therapy.   However, if no  mutations identified then we may want to start basic chemotherapy with possibly adding immunotherapy once his total next generation sequencing is available. If he is found to have high PD-L1 at 50% or higher then may still want to hold off on immunotherapy as first-line until we know he has  mutation testing completed. -Social Determinants of Health: patient has missed multiple appointments last week. Out of ethical obligation I discussed with patient and his wife that there may be cancer center close to his home compared to the 30-40 minute drive that he is making now to get to our clinic. He and his wife understand that we are happy to continue to move forward with his care but understood if he decided that he wanted to pursue care close to home. Patient and wife made note of travel burden. They are interested in a referral to [address] [place] [address]. We discussed that we are absolutely happy to continue to be his provider but for him and his wife to further discuss and ultimately if he decides to go to [facility] then he can always come back if he changes his mind. I expressed the importance that he start systemic therapy soon after completing palliative radiation. He will return next week but I will place orders for the patient to potentially receive pemetrexed,carboplatin +/-  keytruda depending on treatment results.  - HEPATITIS C AB; Future  - HEP B SURFACE AB; Future  - HEPATITIS B CORE AB, TOTAL; Future  - HEP B SURFACE AG; Future    2. Cancer related pain  -Patient reports overall his pain is controlled on the he seems to find more relief with breakthrough pain medication than the long-acting morphine.  -This pain medication has been provided by the inpatient palliative care consult [facility] service by Dr. Anyi Dowd. It appears the patient has follow-up appointment in the outpatient setting for palliative care. Defer pain management to palliative care at this time. No refill request made today.       3. Metastatic adenocarcinoma to bone [facility])  -Patient is to undergo some treatment to his spine with radiation per his report. 4. Malignant ascites  -Explained that his distended abdomen is related to abdominal malignant ascites and possibly poor nutrition. He is working on his nutrition. 5. Lung cancer metastatic to brain [facility])  -Patient is currently undergoing palliative brain radiation due to his extensive brain involvement. FOLLOW-UP:  Follow-up and Dispositions    Return in about 11 days (around 5/12/2023). Please schedule in early afternoon.     Mikhail Alexander MD  Hematology/Medical Oncology Provider  [place] [address]  P: [phone]    Signed By:   Francia Bojorquez MD

## 2023-05-01 NOTE — PROGRESS NOTES
Labs drawn peripherally from [facility] [place] - sent to lab for processing. Guardant testing completed.      Abril Eisenberg RN  05/01/23

## 2023-05-01 NOTE — PROGRESS NOTES
1. Have you been to the ER, urgent care clinic since your last visit? Hospitalized since your last visit? NP    2. Have you seen or consulted any other health care providers outside of the [address] [place] [address] since your last visit? Include any pap smears or colon screening.   NP        Visit Vitals  /75 (BP 1 Location: Right upper arm, BP Patient Position: Sitting, BP Cuff Size: Large adult)   Pulse (!) 126   Temp 97.3 °F (36.3 °C) (Temporal)   Resp 22   Ht 5' 6\" (1.676 m)   Wt 185 lb (83.9 kg)   SpO2 91%   BMI 29.86 kg/m²             Chief Complaint   Patient presents with    New Patient

## 2023-05-01 NOTE — TELEPHONE ENCOUNTER
Patient's wife called this evening. At today's visit they did not indicate that [facility] was out of his pain medication despite our discussion or that they were requesting that our clinic take over the Rx's. They noted that he seemed to respond to hydrocodone but not as much with morphine. .  Palliative care has been managing his medication and since he has an appointment to see Dr. Arnav Ambrose next week I instructed his wife to have the  call palliative on call to address their refill request.  I asked her to call back if needed. He has Morphine long acting available. Please contact us if any new questions or concerns. Non-Urgent Matters: Call our clinic at [phone] during open clinic hours. Please note that [employer] Messages may not be immediately returned. Urgent Matters: Call hospital  ([address], or [facility]) at night or on weekends for questions that cannot wait until clinic opens. Emergency: Call 9-1-1.     Mikhail Alexander MD  Hematology/Medical Oncology Physician  [facility]  P: [phone]

## 2023-05-02 ENCOUNTER — TELEPHONE (OUTPATIENT)
Dept: PALLATIVE CARE | Age: 53
End: 2023-05-02

## 2023-05-02 ENCOUNTER — HOSPITAL ENCOUNTER (OUTPATIENT)
Dept: RADIATION THERAPY | Age: 53
Discharge: HOME | End: 2023-05-02
Payer: MEDICAID

## 2023-05-02 ENCOUNTER — TELEPHONE (OUTPATIENT)
Dept: ONCOLOGY | Age: 53
End: 2023-05-02

## 2023-05-02 DIAGNOSIS — Z79.891 LONG TERM (CURRENT) USE OF OPIATE ANALGESIC: ICD-10-CM

## 2023-05-02 PROBLEM — G89.3 CANCER RELATED PAIN: Status: ACTIVE | Noted: 2023-01-01

## 2023-05-02 PROBLEM — C34.90 NON-SMALL CELL LUNG CANCER METASTATIC TO LIVER (HCC): Status: ACTIVE | Noted: 2023-05-02

## 2023-05-02 PROBLEM — G89.3 CANCER RELATED PAIN: Status: ACTIVE | Noted: 2023-05-02

## 2023-05-02 PROBLEM — C78.7 NON-SMALL CELL LUNG CANCER METASTATIC TO LIVER (HCC): Status: ACTIVE | Noted: 2023-05-02

## 2023-05-02 PROBLEM — C78.7 NON-SMALL CELL LUNG CANCER METASTATIC TO LIVER (HCC): Status: ACTIVE | Noted: 2023-01-01

## 2023-05-02 PROBLEM — C34.90 LUNG CANCER METASTATIC TO BRAIN (HCC): Status: ACTIVE | Noted: 2023-05-02

## 2023-05-02 PROBLEM — R18.0 MALIGNANT ASCITES: Status: ACTIVE | Noted: 2023-01-01

## 2023-05-02 PROBLEM — C79.31 LUNG CANCER METASTATIC TO BRAIN (HCC): Status: ACTIVE | Noted: 2023-01-01

## 2023-05-02 PROBLEM — C79.51 METASTATIC ADENOCARCINOMA TO BONE (HCC): Status: ACTIVE | Noted: 2023-05-02

## 2023-05-02 PROBLEM — C34.90 LUNG CANCER METASTATIC TO BRAIN (HCC): Status: ACTIVE | Noted: 2023-01-01

## 2023-05-02 PROBLEM — C79.31 LUNG CANCER METASTATIC TO BRAIN (HCC): Status: ACTIVE | Noted: 2023-05-02

## 2023-05-02 PROBLEM — C34.90 NON-SMALL CELL LUNG CANCER METASTATIC TO LIVER (HCC): Status: ACTIVE | Noted: 2023-01-01

## 2023-05-02 PROBLEM — C79.51 METASTATIC ADENOCARCINOMA TO BONE (HCC): Status: ACTIVE | Noted: 2023-01-01

## 2023-05-02 PROBLEM — R18.0 MALIGNANT ASCITES: Status: ACTIVE | Noted: 2023-05-02

## 2023-05-02 LAB
HBV CORE AB SERPL QL IA: NEGATIVE
HBV SURFACE AB SER QL: NONREACTIVE
HBV SURFACE AB SER-ACNC: <3.1 MIU/ML
HBV SURFACE AG SER QL: <0.1 INDEX
HBV SURFACE AG SER QL: NEGATIVE
HCV AB SERPL QL IA: NONREACTIVE

## 2023-05-02 PROCEDURE — 77412 RADIATION TX DELIVERY LVL 3: CPT

## 2023-05-03 ENCOUNTER — TELEPHONE (OUTPATIENT)
Dept: ONCOLOGY | Age: 53
End: 2023-05-03

## 2023-05-03 ENCOUNTER — TELEPHONE (OUTPATIENT)
Dept: PALLATIVE CARE | Age: 53
End: 2023-05-03

## 2023-05-03 ENCOUNTER — HOSPITAL ENCOUNTER (OUTPATIENT)
Dept: RADIATION THERAPY | Age: 53
Discharge: HOME | End: 2023-05-03
Payer: MEDICAID

## 2023-05-03 DIAGNOSIS — C34.90 LUNG CANCER METASTATIC TO BRAIN (HCC): ICD-10-CM

## 2023-05-03 DIAGNOSIS — M89.9 LYTIC BONE LESIONS ON XRAY: ICD-10-CM

## 2023-05-03 DIAGNOSIS — M54.59 INTRACTABLE LOW BACK PAIN: ICD-10-CM

## 2023-05-03 DIAGNOSIS — R16.0 LIVER MASSES: ICD-10-CM

## 2023-05-03 DIAGNOSIS — C34.90 NON-SMALL CELL LUNG CANCER METASTATIC TO LIVER (HCC): Primary | ICD-10-CM

## 2023-05-03 DIAGNOSIS — C79.51 METASTATIC ADENOCARCINOMA TO BONE (HCC): ICD-10-CM

## 2023-05-03 DIAGNOSIS — C79.31 LUNG CANCER METASTATIC TO BRAIN (HCC): ICD-10-CM

## 2023-05-03 DIAGNOSIS — R18.0 MALIGNANT ASCITES: ICD-10-CM

## 2023-05-03 DIAGNOSIS — C78.7 NON-SMALL CELL LUNG CANCER METASTATIC TO LIVER (HCC): Primary | ICD-10-CM

## 2023-05-03 DIAGNOSIS — R91.8 MASS OF UPPER LOBE OF LEFT LUNG: ICD-10-CM

## 2023-05-03 PROCEDURE — 77412 RADIATION TX DELIVERY LVL 3: CPT

## 2023-05-03 PROCEDURE — 77336 RADIATION PHYSICS CONSULT: CPT

## 2023-05-03 RX ORDER — OXYCODONE HYDROCHLORIDE 15 MG/1
15 TABLET ORAL
Qty: 42 TABLET | Refills: 0 | Status: SHIPPED | OUTPATIENT
Start: 2023-05-03 | End: 2023-05-10

## 2023-05-03 RX ORDER — NALOXONE HYDROCHLORIDE 4 MG/.1ML
SPRAY NASAL
Qty: 1 EACH | Refills: 0 | Status: CANCELLED | OUTPATIENT
Start: 2023-05-03

## 2023-05-04 ENCOUNTER — HOSPITAL ENCOUNTER (OUTPATIENT)
Dept: RADIATION THERAPY | Age: 53
Discharge: HOME | End: 2023-05-04
Payer: MEDICAID

## 2023-05-04 PROCEDURE — 77412 RADIATION TX DELIVERY LVL 3: CPT

## 2023-05-05 ENCOUNTER — HOSPITAL ENCOUNTER (OUTPATIENT)
Dept: RADIATION THERAPY | Age: 53
Discharge: HOME | End: 2023-05-05
Payer: MEDICAID

## 2023-05-05 PROCEDURE — 77412 RADIATION TX DELIVERY LVL 3: CPT

## 2023-05-08 ENCOUNTER — HOSPITAL ENCOUNTER (OUTPATIENT)
Facility: HOSPITAL | Age: 53
Discharge: HOME | End: 2023-05-11
Attending: RADIOLOGY
Payer: MEDICAID

## 2023-05-08 ENCOUNTER — TELEPHONE (OUTPATIENT)
Age: 53
End: 2023-05-08

## 2023-05-08 PROCEDURE — 77417 THER RADIOLOGY PORT IMAGE(S): CPT

## 2023-05-08 PROCEDURE — 77412 RADIATION TX DELIVERY LVL 3: CPT

## 2023-05-08 NOTE — TELEPHONE ENCOUNTER
Called patient to advise/confirm upcoming outpatient Palliative Medicine appointment with Dr. Kae Fields on 5/9/23 at 10:30am at the [facility] office. This nurse spoke with patient and confirmed appointment. Also advised to please bring in 's License and [employer] and any pain medications in the original container with you to appointment.

## 2023-05-09 ENCOUNTER — HOSPITAL ENCOUNTER (OUTPATIENT)
Facility: HOSPITAL | Age: 53
Discharge: HOME | End: 2023-05-12
Attending: RADIOLOGY
Payer: MEDICAID

## 2023-05-09 PROCEDURE — 77412 RADIATION TX DELIVERY LVL 3: CPT

## 2023-05-10 ENCOUNTER — OFFICE VISIT (OUTPATIENT)
Age: 53
End: 2023-05-10
Payer: MEDICAID

## 2023-05-10 ENCOUNTER — HOSPITAL ENCOUNTER (OUTPATIENT)
Facility: HOSPITAL | Age: 53
Discharge: HOME | End: 2023-05-13
Attending: RADIOLOGY
Payer: MEDICAID

## 2023-05-10 VITALS
WEIGHT: 182 LBS | DIASTOLIC BLOOD PRESSURE: 88 MMHG | HEIGHT: 66 IN | TEMPERATURE: 97.8 F | BODY MASS INDEX: 29.25 KG/M2 | SYSTOLIC BLOOD PRESSURE: 120 MMHG | RESPIRATION RATE: 20 BRPM | OXYGEN SATURATION: 94 % | HEART RATE: 124 BPM

## 2023-05-10 DIAGNOSIS — K59.03 CONSTIPATION DUE TO OPIOID THERAPY: ICD-10-CM

## 2023-05-10 DIAGNOSIS — R63.0 POOR APPETITE: ICD-10-CM

## 2023-05-10 DIAGNOSIS — R11.0 NAUSEA: ICD-10-CM

## 2023-05-10 DIAGNOSIS — T40.2X5A CONSTIPATION DUE TO OPIOID THERAPY: ICD-10-CM

## 2023-05-10 DIAGNOSIS — C34.90 METASTATIC NON-SMALL CELL LUNG CANCER (HCC): ICD-10-CM

## 2023-05-10 DIAGNOSIS — G89.3 CANCER ASSOCIATED PAIN: Primary | ICD-10-CM

## 2023-05-10 DIAGNOSIS — Z71.89 ADVANCED CARE PLANNING/COUNSELING DISCUSSION: ICD-10-CM

## 2023-05-10 PROCEDURE — 99497 ADVNCD CARE PLAN 30 MIN: CPT | Performed by: STUDENT IN AN ORGANIZED HEALTH CARE EDUCATION/TRAINING PROGRAM

## 2023-05-10 PROCEDURE — 3079F DIAST BP 80-89 MM HG: CPT | Performed by: STUDENT IN AN ORGANIZED HEALTH CARE EDUCATION/TRAINING PROGRAM

## 2023-05-10 PROCEDURE — 3074F SYST BP LT 130 MM HG: CPT | Performed by: STUDENT IN AN ORGANIZED HEALTH CARE EDUCATION/TRAINING PROGRAM

## 2023-05-10 PROCEDURE — 99214 OFFICE O/P EST MOD 30 MIN: CPT | Performed by: STUDENT IN AN ORGANIZED HEALTH CARE EDUCATION/TRAINING PROGRAM

## 2023-05-10 PROCEDURE — 77336 RADIATION PHYSICS CONSULT: CPT

## 2023-05-10 PROCEDURE — 77412 RADIATION TX DELIVERY LVL 3: CPT

## 2023-05-10 RX ORDER — METHOCARBAMOL 500 MG/1
TABLET, FILM COATED ORAL
COMMUNITY
Start: 2023-03-30 | End: 2023-05-10

## 2023-05-10 RX ORDER — OXYCODONE HYDROCHLORIDE 15 MG/1
15 TABLET ORAL EVERY 4 HOURS PRN
COMMUNITY
Start: 2023-05-03 | End: 2023-05-10 | Stop reason: SDUPTHER

## 2023-05-10 RX ORDER — MORPHINE SULFATE 15 MG/1
15 TABLET, FILM COATED, EXTENDED RELEASE ORAL EVERY 12 HOURS
Qty: 60 TABLET | Refills: 0 | Status: SHIPPED | OUTPATIENT
Start: 2023-05-10 | End: 2023-06-09

## 2023-05-10 RX ORDER — MIRTAZAPINE 7.5 MG/1
7.5 TABLET, FILM COATED ORAL NIGHTLY
Qty: 30 TABLET | Refills: 5 | Status: SHIPPED | OUTPATIENT
Start: 2023-05-10

## 2023-05-10 RX ORDER — ONDANSETRON 4 MG/1
4 TABLET, ORALLY DISINTEGRATING ORAL EVERY 8 HOURS PRN
Qty: 60 TABLET | Refills: 2 | Status: SHIPPED | OUTPATIENT
Start: 2023-05-10

## 2023-05-10 RX ORDER — PANTOPRAZOLE SODIUM 40 MG/1
TABLET, DELAYED RELEASE ORAL
COMMUNITY
Start: 2023-04-15

## 2023-05-10 RX ORDER — DEXAMETHASONE 4 MG/1
TABLET ORAL
COMMUNITY
Start: 2023-05-02

## 2023-05-10 RX ORDER — OXYCODONE HYDROCHLORIDE 15 MG/1
15 TABLET ORAL EVERY 4 HOURS PRN
Qty: 84 TABLET | Refills: 0 | Status: SHIPPED | OUTPATIENT
Start: 2023-05-10 | End: 2023-05-24

## 2023-05-10 ASSESSMENT — PATIENT HEALTH QUESTIONNAIRE - PHQ9
SUM OF ALL RESPONSES TO PHQ QUESTIONS 1-9: 1
SUM OF ALL RESPONSES TO PHQ9 QUESTIONS 1 & 2: 1
1. LITTLE INTEREST OR PLEASURE IN DOING THINGS: 0
2. FEELING DOWN, DEPRESSED OR HOPELESS: 1

## 2023-05-10 NOTE — ACP (ADVANCE CARE PLANNING)
Advance Care Planning     Advance Care Planning (ACP) Physician/NP/PA Conversation    Date of Conversation: 5/10/2023  Conducted with: Patient with [address] Decision Maker: Next of Kin by law (only applies in absence of above) (name) [address] Decision Maker:    Primary Decision Maker: Jose Anthony - Spouse - [phone]    Secondary Decision Maker: Darcy Jean Baptiste - Child - [phone]  Click here to complete Healthcare Decision Makers including selection of the Healthcare Decision Maker Relationship (ie \"Primary\"). Today we documented Decision Maker(s) consistent with Legal Next of Kin hierarchy. Care Preferences:    - We discussed and you understand that you have stage IV lung cancer. We discussed that while treatment may help you live a longer life and have more time with family, it is not expected to cure your cancer. You voice understanding of this. We also discussed that when the time comes that treatments is no longer effective, no further treatment can be attempted, or treatment becomes too burdensome for you, then a service like Hospice would be appropriate to ensure that you have comfort, dignity, and are with your family in the time that remains to you. You are not ready for hospice at this time. - You shared today that you wish for your wife to be your primary decision maker and then your daughter Jocy Benson to be second. - We discussed any limitations to your care including resuscitation attempts. You shared that at present you would wish to undergo resuscitation attempts - Full Code. We will continue to engage in this conversation.  - We discussed completing an AMD, which you have a copy of at home but are not ready today to complete. I advised that we do this no later than your next visit.     Conversation Outcomes / Follow-Up Plan:    Reviewed DNR/DNI and patient elects Full Code (Attempt Resuscitation)    Length of Voluntary ACP

## 2023-05-10 NOTE — PATIENT INSTRUCTIONS
Dear Koby Treviño ,    It was a pleasure seeing you today. We will see you again in 4 weeks. If labs or imaging tests have been ordered for you today, please call the office at [phone] about 48 hours after completion to obtain the results. Your stated goal:   Follow-up with Oncology and attempt therapy/treatment for your cancer. This is the plan we talked about:  # Cancer Pain  - You have pain mostly in your lower back that is suspected to be due to your cancer.  - We will continue you on Morphine ER (long acting) 15mg every 12 hours that you may take at 9am and 9pm.  This medication is to keep your pain at a tolerable level around the clock and you will take it morning and evening regardless of your pain level. - Continue oxycodone IR (short acting) 15mg every 4 hours as needed for pain. This medicine is for when your pain flares up or spikes and you will only take it when you have pain. - You have finished radiation to your back today; hopefully this will improve your pain over next week or 2. # Nausea  - You have occasional nausea that is relieved with Zofran. We will send in a refill of this today. # Constipation  - We know opioid medications (like morphine and oxycodone) will cause constipation and this does not go away while you are on these medications  - Please continue to take miralax daily as it seems to be working for you thus far. - Please let the clinic know if constipation worsens and we can add additional medication    # Dry Mouth  - I would recommend trying Biotene lozenges which can be purchased over the counter at any major pharmacy    # Poor Appetite and Sleep  - We will try a new medication called mirtazapine 7.5mg by mouth that you will take at night; it helps both to stimulate your appetite and get you to sleep. - You have been drinking ensures and I think this is a good idea to keep your nutrition up.     This is what you have shared with us about Advance Care

## 2023-05-12 ENCOUNTER — TELEPHONE (OUTPATIENT)
Age: 53
End: 2023-05-12

## 2023-05-12 ENCOUNTER — OFFICE VISIT (OUTPATIENT)
Age: 53
End: 2023-05-12
Payer: MEDICAID

## 2023-05-12 VITALS
HEIGHT: 66 IN | DIASTOLIC BLOOD PRESSURE: 79 MMHG | HEART RATE: 106 BPM | RESPIRATION RATE: 20 BRPM | WEIGHT: 181.2 LBS | SYSTOLIC BLOOD PRESSURE: 115 MMHG | OXYGEN SATURATION: 93 % | BODY MASS INDEX: 29.12 KG/M2 | TEMPERATURE: 98.8 F

## 2023-05-12 DIAGNOSIS — C34.90 NON-SMALL CELL LUNG CANCER METASTATIC TO LIVER (HCC): ICD-10-CM

## 2023-05-12 DIAGNOSIS — C34.90 LUNG CANCER METASTATIC TO BRAIN (HCC): ICD-10-CM

## 2023-05-12 DIAGNOSIS — C79.31 LUNG CANCER METASTATIC TO BRAIN (HCC): ICD-10-CM

## 2023-05-12 DIAGNOSIS — G89.3 CANCER RELATED PAIN: ICD-10-CM

## 2023-05-12 DIAGNOSIS — Z51.12 ENCOUNTER FOR ANTINEOPLASTIC IMMUNOTHERAPY: ICD-10-CM

## 2023-05-12 DIAGNOSIS — Z51.11 ENCOUNTER FOR ANTINEOPLASTIC CHEMOTHERAPY: ICD-10-CM

## 2023-05-12 DIAGNOSIS — C79.51 METASTATIC ADENOCARCINOMA TO BONE (HCC): Primary | ICD-10-CM

## 2023-05-12 DIAGNOSIS — C78.7 NON-SMALL CELL LUNG CANCER METASTATIC TO LIVER (HCC): ICD-10-CM

## 2023-05-12 PROCEDURE — 99215 OFFICE O/P EST HI 40 MIN: CPT | Performed by: INTERNAL MEDICINE

## 2023-05-12 PROCEDURE — 3074F SYST BP LT 130 MM HG: CPT | Performed by: INTERNAL MEDICINE

## 2023-05-12 PROCEDURE — 3078F DIAST BP <80 MM HG: CPT | Performed by: INTERNAL MEDICINE

## 2023-05-12 RX ORDER — SODIUM CHLORIDE 0.9 % (FLUSH) 0.9 %
5-40 SYRINGE (ML) INJECTION PRN
OUTPATIENT
Start: 2023-05-18

## 2023-05-12 RX ORDER — CYANOCOBALAMIN 1000 UG/ML
1000 INJECTION, SOLUTION INTRAMUSCULAR; SUBCUTANEOUS ONCE
OUTPATIENT
Start: 2023-05-12 | End: 2023-05-12

## 2023-05-12 RX ORDER — CYANOCOBALAMIN 1000 UG/ML
1000 INJECTION, SOLUTION INTRAMUSCULAR; SUBCUTANEOUS
OUTPATIENT
Start: 2023-05-18

## 2023-05-12 RX ORDER — HEPARIN SODIUM (PORCINE) LOCK FLUSH IV SOLN 100 UNIT/ML 100 UNIT/ML
500 SOLUTION INTRAVENOUS PRN
OUTPATIENT
Start: 2023-05-18

## 2023-05-12 RX ORDER — SODIUM CHLORIDE 9 MG/ML
INJECTION, SOLUTION INTRAVENOUS CONTINUOUS
OUTPATIENT
Start: 2023-05-18

## 2023-05-12 RX ORDER — ONDANSETRON 2 MG/ML
8 INJECTION INTRAMUSCULAR; INTRAVENOUS
OUTPATIENT
Start: 2023-05-18

## 2023-05-12 RX ORDER — EPINEPHRINE 1 MG/ML
0.3 INJECTION, SOLUTION, CONCENTRATE INTRAVENOUS PRN
OUTPATIENT
Start: 2023-05-18

## 2023-05-12 RX ORDER — FAMOTIDINE 10 MG/ML
20 INJECTION, SOLUTION INTRAVENOUS
OUTPATIENT
Start: 2023-05-18

## 2023-05-12 RX ORDER — ALBUTEROL SULFATE 90 UG/1
4 AEROSOL, METERED RESPIRATORY (INHALATION) PRN
OUTPATIENT
Start: 2023-05-18

## 2023-05-12 RX ORDER — DIPHENHYDRAMINE HYDROCHLORIDE 50 MG/ML
50 INJECTION INTRAMUSCULAR; INTRAVENOUS
OUTPATIENT
Start: 2023-05-18

## 2023-05-12 RX ORDER — PALONOSETRON 0.05 MG/ML
0.25 INJECTION, SOLUTION INTRAVENOUS ONCE
OUTPATIENT
Start: 2023-05-18 | End: 2023-05-18

## 2023-05-12 RX ORDER — FOLIC ACID 1 MG/1
1 TABLET ORAL DAILY
Qty: 90 TABLET | Refills: 1 | Status: SHIPPED | OUTPATIENT
Start: 2023-05-12

## 2023-05-12 RX ORDER — ALBUTEROL SULFATE 2.5 MG/3ML
SOLUTION RESPIRATORY (INHALATION)
COMMUNITY
Start: 2023-03-06

## 2023-05-12 RX ORDER — MEPERIDINE HYDROCHLORIDE 50 MG/ML
12.5 INJECTION INTRAMUSCULAR; INTRAVENOUS; SUBCUTANEOUS PRN
OUTPATIENT
Start: 2023-05-18

## 2023-05-12 RX ORDER — SODIUM CHLORIDE 9 MG/ML
5-250 INJECTION, SOLUTION INTRAVENOUS PRN
OUTPATIENT
Start: 2023-05-18

## 2023-05-12 RX ORDER — ACETAMINOPHEN 325 MG/1
650 TABLET ORAL
OUTPATIENT
Start: 2023-05-18

## 2023-05-12 ASSESSMENT — PATIENT HEALTH QUESTIONNAIRE - PHQ9
SUM OF ALL RESPONSES TO PHQ QUESTIONS 1-9: 2
1. LITTLE INTEREST OR PLEASURE IN DOING THINGS: 0
SUM OF ALL RESPONSES TO PHQ9 QUESTIONS 1 & 2: 2
SUM OF ALL RESPONSES TO PHQ QUESTIONS 1-9: 2
2. FEELING DOWN, DEPRESSED OR HOPELESS: 2

## 2023-05-12 NOTE — TELEPHONE ENCOUNTER
[address] at [facility]  [phone]    05/12/23 1:03 PM - Provided patient with a chemotherapy/immunotherapy education folder. RN reviewed folder and consent sheet with the patient and opportunity was provided for questions and concerns. A hard copy of the chemotherapy consent was offered to the patient and the patient accepted.

## 2023-05-12 NOTE — PROGRESS NOTES
Chief Complaint   Patient presents with    Follow-up           Vitals:    05/12/23 1205   BP: 115/79   Pulse: (!) 106   Resp: 20   Temp: 98.8 °F (37.1 °C)   SpO2: 93%            1. Have you been to the ER, urgent care clinic since your last visit? Hospitalized since your last visit? No  2. Have you seen or consulted any other health care providers outside of the [address] [place] [address] since your last visit? Include any pap smears or colon screening.  No
ischemia/infarction. We discussed that low platelets place patient at risk for spontaneous bleeding and increased bleeding risks. We discussed when these symptoms are not monitored or patient does not inform us of developing/onset of symptoms that the risk of death can be markedly increased. We discussed risk for secondary malignancy. We discussed risk of anaphylaxis. We discussed the risk of infusion reactions, the risk of chemotherapy infiltration/extravasation and potentially tissue necrosis. Provided [employer] handout on Carboplatin with a discussion highlighting side effects including but not exclusive to the following risks: we highlighted the risk of myelosuppression, risk of  nausea/vomiting/dysgeusia/alopecia/weakness. Also electrolyte abnormalities can occur such as hypomagnesia. Rare risks of neuropathy,anaphylaxis, constipation, infection,diarrhea, central neurotoxicity, nephrotoxicity/ototoxicity/Thromboembolic Events. Rare risks of secondary malignancy. Rarely death. Provided [employer] handout on Pemetrexed including but not exclusive to the following side effect risks: we highlighted risks of fatigue,nausea/vomiting, poor  appetite, diarrhea, Pharyngitis, Skin Rash. We discussed rare risks of anaphylaxis/secondary malignancy. I will proceed with a dose reduction for cycle with with Carboplatin AUC 4 and Pemetrexed down to 400mg/m2 (20%). If tolerated well then can consider escalating the dosing. 6. Encounter for antineoplastic immunotherapy  -Today, I provided a thorough discussion regarding the risks and benefits of systemic immunotherapy. Patient provided [facility] handout on pembrolizumab. Patient has provided both oral and written informed consent (see separate signed consent form). Our discussion initially focused on the generalities of immunotherapy and why side effects can occur from this medication by overactivating the immune system.  We discussed the hope that the

## 2023-05-17 ENCOUNTER — TELEPHONE (OUTPATIENT)
Facility: HOSPITAL | Age: 53
End: 2023-05-17

## 2023-05-18 ENCOUNTER — HOSPITAL ENCOUNTER (OUTPATIENT)
Facility: HOSPITAL | Age: 53
Setting detail: INFUSION SERIES
End: 2023-05-18

## 2023-05-30 ENCOUNTER — TELEPHONE (OUTPATIENT)
Age: 53
End: 2023-05-30

## 2023-05-30 NOTE — TELEPHONE ENCOUNTER
[facility] called.  Stated they need a death certificate signed    CB# [phone]

## 2023-05-30 NOTE — PROGRESS NOTES
Palliative Medicine Office Visit  Palliative Medicine Nurse Check In  [phone]    Patient Name: Inocencia Israel  YOB: 1970      Date of Office Visit: 5/10/2023    Patient states: \"  \"    From Check In Sheet (scanned in Media):  Has a medical provider talked with you about cardiopulmonary resuscitation (CPR)? [x] Yes   [] No   [] Unable to obtain    Nurse reminder to complete or update ACP FlowSheet:    Is ACP on the Problem List?    [] Yes    [x] No  IF ACP Document is ON FILE; Nurse to place ACP on Problem List     Is there an ACP Note in Chart Review/Note? [] Yes    [x] No   If NO: ALERT PROVIDER       Primary Decision Maker: Regina Devi - Spouse - [phone]  Demographics 5/10/2023   Marital Status         Is there anything that we should know about you as a person in order to provide you the best care possible? Functional status (describe):         Last BM: 5/10/2023     accessed (date):      Bottle review (for opioid pain medication):  Medication 1:   Date filled:   Directions:   # filled:   # left:   # pills taking per day:  Last dose taken:    Medication 2:   Date filled:   Directions:   # filled:   # left:   # pills taking per day:  Last dose taken:    Medication 3:   Date filled:   Directions:   # filled:   # left:   # pills taking per day:  Last dose taken:    Medication 4:   Date filled:   Directions:   # filled:   # left:   # pills taking per day:  Last dose taken:
04/12/2023 12:08 PM      CT Lumbar spine wo contrast 4/11/2023: Multifocal lytic lesions of bone appearing  largest in the L2 vertebral body in the right L3 pedicle. CT Chest wo contrast 4/11/2023: Diffuse pulmonary nodules consistent with metastatic disease. Focal masslike opacity in the left upper  lobe medially suspicious for malignancy. Evaluation is limited without IV contrast. Similar finding may be related to postobstructive changes. Lytic lesions in the thoracic spine consistent with metastatic disease. CT Abdomen w/wo contrast 4/12/2023: Innumerable hepatic metastases. Extensive peritoneal carcinomatosis. Scattered lytic osseous metastases,  with chronic appearing T11 pathologic compression deformity. Small volume ascites, likely malignant. Left adrenal adenoma. CT guided lung biopsy 4/12/2023: adenocarcinoma, favor lung primary, insufficient sample for additional testing  MRI Cervical/Thoracic/Lumbar spine 4/12/2023: Multiple metastases throughout the visualized spine. Mild pathologic compression deformity of I93 of uncertain age. Several enhancing lesions in the  cerebellum likely representing brain metastases. MRI Brain 4/14/2023: Innumerable (greater than 30) supratentorial and infratentorial metastases, the majority of which are hemorrhagic, with varying degrees of surrounding edema, but no midline  shift or herniation. Osseous metastasis in the left occipital bone. Bone scan 4/14/2023: Numerous bone metastases. CT Chest with contrast 4/14/2023: Left apical hilar mass measuring 7.3 x 3.4 x 5.7 cm may represent primary versus secondary neoplastic process. Innumerable bilateral pulmonary nodules most consistent  with metastatic disease. Indeterminate mediastinal and hilar nodes for which metastatic disease not excluded. Osseous lytic lesions of the thoracic spine at T2, T10, and T11 most consistent with metastatic disease.   Partially imaged carcinomatosis,  ascites, and hepatic lesions
Rituxan Pregnancy And Lactation Text: This medication is Pregnancy Category C and it isn't know if it is safe during pregnancy. It is unknown if this medication is excreted in breast milk but similar antibodies are known to be excreted.

## 2023-05-31 ENCOUNTER — CLINICAL DOCUMENTATION (OUTPATIENT)
Age: 53
End: 2023-05-31

## 2023-05-31 ENCOUNTER — TELEPHONE (OUTPATIENT)
Age: 53
End: 2023-05-31

## 2023-05-31 NOTE — TELEPHONE ENCOUNTER
Sandeep Comes from [facility] called needing to follow up. Request a call back.        # [phone]

## 2023-05-31 NOTE — TELEPHONE ENCOUNTER
Fausto Pérezney called from [employer] and son's  home to asks about uploading the patient's death certificate to Dr. Savi Lanza. She said that he is not registered and would need to call Pipo Funes at [phone] to register for the [facility] system. Vincent's callback is [phone]. She is not sure if Dr. Shankar Bangura is the correct provider to sign the certificate.

## 2023-06-01 NOTE — TELEPHONE ENCOUNTER
Return call placed to [facility] and son's  home. Instructions given to forward death certificate to Dr. Flores Wheat for signature.  indicated death certificate will be readily available today for signature.

## 2024-10-04 NOTE — TELEPHONE ENCOUNTER
[address] at [place]  [phone]    04/21/23 8:37 AM - This nurse received the following message from the Pathology Department regarding this patient's PD-L1 add-on request (surgical case #[id number]). \"Per Dr. Chucky Zimmer, there is insufficient tissue for further testing so this request can not be fulfilled. Please reach out if you have any questions. \"  Will update MD of above. patient